# Patient Record
Sex: MALE | Race: WHITE | NOT HISPANIC OR LATINO | URBAN - METROPOLITAN AREA
[De-identification: names, ages, dates, MRNs, and addresses within clinical notes are randomized per-mention and may not be internally consistent; named-entity substitution may affect disease eponyms.]

---

## 2017-12-05 ENCOUNTER — EMERGENCY (EMERGENCY)
Facility: HOSPITAL | Age: 47
LOS: 1 days | Discharge: HOME | End: 2017-12-05
Attending: EMERGENCY MEDICINE | Admitting: EMERGENCY MEDICINE
Payer: COMMERCIAL

## 2017-12-05 VITALS
TEMPERATURE: 98 F | RESPIRATION RATE: 16 BRPM | DIASTOLIC BLOOD PRESSURE: 100 MMHG | OXYGEN SATURATION: 97 % | HEART RATE: 91 BPM | SYSTOLIC BLOOD PRESSURE: 163 MMHG | WEIGHT: 315 LBS | HEIGHT: 71 IN

## 2017-12-05 VITALS
OXYGEN SATURATION: 97 % | SYSTOLIC BLOOD PRESSURE: 142 MMHG | RESPIRATION RATE: 16 BRPM | TEMPERATURE: 98 F | DIASTOLIC BLOOD PRESSURE: 94 MMHG | HEART RATE: 89 BPM

## 2017-12-05 DIAGNOSIS — Z98.890 OTHER SPECIFIED POSTPROCEDURAL STATES: Chronic | ICD-10-CM

## 2017-12-05 PROCEDURE — 99283 EMERGENCY DEPT VISIT LOW MDM: CPT

## 2017-12-05 PROCEDURE — 73110 X-RAY EXAM OF WRIST: CPT

## 2017-12-05 PROCEDURE — 99283 EMERGENCY DEPT VISIT LOW MDM: CPT | Mod: 25

## 2017-12-05 PROCEDURE — 73110 X-RAY EXAM OF WRIST: CPT | Mod: 26,LT

## 2017-12-05 NOTE — ED PROVIDER NOTE - MUSCULOSKELETAL, MLM
Spine appears normal, range of motion is not limited, joint tenderness left wrist no obvious deformity.

## 2017-12-05 NOTE — ED PROVIDER NOTE - OBJECTIVE STATEMENT
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 complain of wrist left denies other injury.

## 2017-12-05 NOTE — ED ADULT NURSE NOTE - OBJECTIVE STATEMENT
47 yr old male c/o pain in left wrist s.p stretcher falling on wrist while at work. Pt states pain is when he makes a fist and movement.

## 2018-03-15 ENCOUNTER — APPOINTMENT (OUTPATIENT)
Dept: BARIATRICS | Facility: CLINIC | Age: 48
End: 2018-03-15
Payer: COMMERCIAL

## 2018-03-15 VITALS
WEIGHT: 315 LBS | BODY MASS INDEX: 44.1 KG/M2 | OXYGEN SATURATION: 96 % | SYSTOLIC BLOOD PRESSURE: 128 MMHG | HEIGHT: 71 IN | DIASTOLIC BLOOD PRESSURE: 76 MMHG | HEART RATE: 96 BPM

## 2018-03-15 PROCEDURE — 99244 OFF/OP CNSLTJ NEW/EST MOD 40: CPT

## 2018-03-30 ENCOUNTER — APPOINTMENT (OUTPATIENT)
Dept: CARDIOLOGY | Facility: CLINIC | Age: 48
End: 2018-03-30
Payer: COMMERCIAL

## 2018-03-30 ENCOUNTER — NON-APPOINTMENT (OUTPATIENT)
Age: 48
End: 2018-03-30

## 2018-03-30 VITALS
WEIGHT: 315 LBS | HEIGHT: 71 IN | DIASTOLIC BLOOD PRESSURE: 84 MMHG | OXYGEN SATURATION: 97 % | HEART RATE: 80 BPM | BODY MASS INDEX: 44.1 KG/M2 | SYSTOLIC BLOOD PRESSURE: 147 MMHG

## 2018-03-30 VITALS — DIASTOLIC BLOOD PRESSURE: 89 MMHG | SYSTOLIC BLOOD PRESSURE: 144 MMHG

## 2018-03-30 PROCEDURE — 99214 OFFICE O/P EST MOD 30 MIN: CPT

## 2018-03-30 PROCEDURE — 93000 ELECTROCARDIOGRAM COMPLETE: CPT | Mod: 59

## 2018-03-30 PROCEDURE — 93224 XTRNL ECG REC UP TO 48 HRS: CPT

## 2018-04-11 ENCOUNTER — RESULT CHARGE (OUTPATIENT)
Age: 48
End: 2018-04-11

## 2018-04-20 DIAGNOSIS — E55.9 VITAMIN D DEFICIENCY, UNSPECIFIED: ICD-10-CM

## 2018-04-20 DIAGNOSIS — E11.65 TYPE 2 DIABETES MELLITUS WITH HYPERGLYCEMIA: ICD-10-CM

## 2018-04-20 LAB
25(OH)D3 SERPL-MCNC: 7.2 NG/ML
ALBUMIN SERPL ELPH-MCNC: 3.7 G/DL
ALP BLD-CCNC: 66 U/L
ALT SERPL-CCNC: 28 U/L
ANION GAP SERPL CALC-SCNC: 14 MMOL/L
AST SERPL-CCNC: 16 U/L
BASOPHILS # BLD AUTO: 0.02 K/UL
BASOPHILS NFR BLD AUTO: 0.3 %
BILIRUB SERPL-MCNC: 0.5 MG/DL
BUN SERPL-MCNC: 13 MG/DL
CALCIUM SERPL-MCNC: 8.5 MG/DL
CALCIUM SERPL-MCNC: 8.5 MG/DL
CHLORIDE SERPL-SCNC: 99 MMOL/L
CHOLEST SERPL-MCNC: 197 MG/DL
CHOLEST/HDLC SERPL: 6.4 RATIO
CO2 SERPL-SCNC: 25 MMOL/L
CREAT SERPL-MCNC: 1 MG/DL
EOSINOPHIL # BLD AUTO: 0.24 K/UL
EOSINOPHIL NFR BLD AUTO: 3.2 %
FERRITIN SERPL-MCNC: 619 NG/ML
FOLATE SERPL-MCNC: 13.4 NG/ML
GLUCOSE SERPL-MCNC: 264 MG/DL
HBA1C MFR BLD HPLC: 11.2 %
HCT VFR BLD CALC: 49.7 %
HDLC SERPL-MCNC: 31 MG/DL
HGB BLD-MCNC: 16 G/DL
IMM GRANULOCYTES NFR BLD AUTO: 0.5 %
INR PPP: 0.99 RATIO
IRON SATN MFR SERPL: 32 %
IRON SERPL-MCNC: 78 UG/DL
LDLC SERPL CALC-MCNC: 104 MG/DL
LYMPHOCYTES # BLD AUTO: 1.94 K/UL
LYMPHOCYTES NFR BLD AUTO: 26.3 %
MAN DIFF?: NORMAL
MCHC RBC-ENTMCNC: 28.4 PG
MCHC RBC-ENTMCNC: 32.2 GM/DL
MCV RBC AUTO: 88.3 FL
MONOCYTES # BLD AUTO: 0.65 K/UL
MONOCYTES NFR BLD AUTO: 8.8 %
NEUTROPHILS # BLD AUTO: 4.5 K/UL
NEUTROPHILS NFR BLD AUTO: 60.9 %
PARATHYROID HORMONE INTACT: 44 PG/ML
PLATELET # BLD AUTO: 162 K/UL
POTASSIUM SERPL-SCNC: 4 MMOL/L
PROT SERPL-MCNC: 6.9 G/DL
PT BLD: 11.2 SEC
RBC # BLD: 5.63 M/UL
RBC # FLD: 13.4 %
SODIUM SERPL-SCNC: 138 MMOL/L
TIBC SERPL-MCNC: 242 UG/DL
TRIGL SERPL-MCNC: 308 MG/DL
TSH SERPL-ACNC: 3.33 UIU/ML
UIBC SERPL-MCNC: 164 UG/DL
VIT A SERPL-MCNC: 30.2 UG/DL
VIT B1 SERPL-MCNC: 136.7 NMOL/L
VIT B12 SERPL-MCNC: 431 PG/ML
VIT B6 SERPL-MCNC: 5.4 UG/L
WBC # FLD AUTO: 7.39 K/UL

## 2018-04-25 ENCOUNTER — TRANSCRIPTION ENCOUNTER (OUTPATIENT)
Age: 48
End: 2018-04-25

## 2018-05-04 ENCOUNTER — MESSAGE (OUTPATIENT)
Age: 48
End: 2018-05-04

## 2018-05-24 ENCOUNTER — APPOINTMENT (OUTPATIENT)
Dept: CARDIOLOGY | Facility: CLINIC | Age: 48
End: 2018-05-24
Payer: COMMERCIAL

## 2018-05-24 PROCEDURE — 93306 TTE W/DOPPLER COMPLETE: CPT

## 2018-05-30 ENCOUNTER — APPOINTMENT (OUTPATIENT)
Dept: CARDIOLOGY | Facility: CLINIC | Age: 48
End: 2018-05-30
Payer: COMMERCIAL

## 2018-05-30 PROCEDURE — 93351 STRESS TTE COMPLETE: CPT

## 2018-05-30 PROCEDURE — 93352 ADMIN ECG CONTRAST AGENT: CPT

## 2018-05-30 PROCEDURE — 93320 DOPPLER ECHO COMPLETE: CPT

## 2018-05-31 ENCOUNTER — APPOINTMENT (OUTPATIENT)
Dept: PULMONOLOGY | Facility: CLINIC | Age: 48
End: 2018-05-31
Payer: COMMERCIAL

## 2018-05-31 ENCOUNTER — APPOINTMENT (OUTPATIENT)
Dept: GASTROENTEROLOGY | Facility: CLINIC | Age: 48
End: 2018-05-31
Payer: COMMERCIAL

## 2018-05-31 VITALS
HEIGHT: 71 IN | SYSTOLIC BLOOD PRESSURE: 132 MMHG | HEART RATE: 88 BPM | RESPIRATION RATE: 14 BRPM | OXYGEN SATURATION: 95 % | DIASTOLIC BLOOD PRESSURE: 78 MMHG | WEIGHT: 315 LBS | BODY MASS INDEX: 44.1 KG/M2

## 2018-05-31 VITALS
HEIGHT: 71 IN | SYSTOLIC BLOOD PRESSURE: 130 MMHG | HEART RATE: 89 BPM | OXYGEN SATURATION: 95 % | DIASTOLIC BLOOD PRESSURE: 74 MMHG

## 2018-05-31 DIAGNOSIS — F17.290 NICOTINE DEPENDENCE, OTHER TOBACCO PRODUCT, UNCOMPLICATED: ICD-10-CM

## 2018-05-31 PROCEDURE — 99204 OFFICE O/P NEW MOD 45 MIN: CPT | Mod: 25

## 2018-05-31 PROCEDURE — 99214 OFFICE O/P EST MOD 30 MIN: CPT

## 2018-05-31 PROCEDURE — 94010 BREATHING CAPACITY TEST: CPT

## 2018-05-31 PROCEDURE — 85018 HEMOGLOBIN: CPT | Mod: QW

## 2018-05-31 PROCEDURE — 94729 DIFFUSING CAPACITY: CPT

## 2018-05-31 PROCEDURE — 94727 GAS DIL/WSHOT DETER LNG VOL: CPT

## 2018-06-01 ENCOUNTER — MESSAGE (OUTPATIENT)
Age: 48
End: 2018-06-01

## 2018-06-07 ENCOUNTER — NON-APPOINTMENT (OUTPATIENT)
Age: 48
End: 2018-06-07

## 2018-06-07 ENCOUNTER — APPOINTMENT (OUTPATIENT)
Dept: ELECTROPHYSIOLOGY | Facility: CLINIC | Age: 48
End: 2018-06-07
Payer: COMMERCIAL

## 2018-06-07 VITALS
HEART RATE: 79 BPM | OXYGEN SATURATION: 96 % | HEIGHT: 71 IN | SYSTOLIC BLOOD PRESSURE: 122 MMHG | DIASTOLIC BLOOD PRESSURE: 72 MMHG

## 2018-06-07 PROCEDURE — 99244 OFF/OP CNSLTJ NEW/EST MOD 40: CPT

## 2018-06-07 PROCEDURE — 93000 ELECTROCARDIOGRAM COMPLETE: CPT

## 2018-06-21 ENCOUNTER — APPOINTMENT (OUTPATIENT)
Dept: BARIATRICS/WEIGHT MGMT | Facility: CLINIC | Age: 48
End: 2018-06-21
Payer: COMMERCIAL

## 2018-06-21 VITALS — BODY MASS INDEX: 52.71 KG/M2 | WEIGHT: 315 LBS

## 2018-06-21 DIAGNOSIS — F17.200 NICOTINE DEPENDENCE, UNSPECIFIED, UNCOMPLICATED: ICD-10-CM

## 2018-06-21 PROCEDURE — 90791 PSYCH DIAGNOSTIC EVALUATION: CPT

## 2018-06-22 ENCOUNTER — OUTPATIENT (OUTPATIENT)
Dept: OUTPATIENT SERVICES | Facility: HOSPITAL | Age: 48
LOS: 1 days | End: 2018-06-22
Payer: COMMERCIAL

## 2018-06-22 DIAGNOSIS — Z98.890 OTHER SPECIFIED POSTPROCEDURAL STATES: Chronic | ICD-10-CM

## 2018-06-22 DIAGNOSIS — G47.33 OBSTRUCTIVE SLEEP APNEA (ADULT) (PEDIATRIC): ICD-10-CM

## 2018-06-22 PROCEDURE — 95810 POLYSOM 6/> YRS 4/> PARAM: CPT | Mod: 26

## 2018-06-22 PROCEDURE — 95810 POLYSOM 6/> YRS 4/> PARAM: CPT

## 2018-07-02 ENCOUNTER — APPOINTMENT (OUTPATIENT)
Dept: PULMONOLOGY | Facility: CLINIC | Age: 48
End: 2018-07-02
Payer: COMMERCIAL

## 2018-07-02 VITALS — HEART RATE: 91 BPM | DIASTOLIC BLOOD PRESSURE: 90 MMHG | OXYGEN SATURATION: 96 % | SYSTOLIC BLOOD PRESSURE: 140 MMHG

## 2018-07-02 PROCEDURE — 99214 OFFICE O/P EST MOD 30 MIN: CPT

## 2018-07-02 RX ORDER — ZOLPIDEM TARTRATE 10 MG/1
10 TABLET ORAL
Qty: 1 | Refills: 0 | Status: DISCONTINUED | COMMUNITY
Start: 2018-05-31 | End: 2018-07-02

## 2018-07-05 ENCOUNTER — APPOINTMENT (OUTPATIENT)
Dept: PULMONOLOGY | Facility: CLINIC | Age: 48
End: 2018-07-05

## 2018-07-13 ENCOUNTER — APPOINTMENT (OUTPATIENT)
Dept: BARIATRICS/WEIGHT MGMT | Facility: CLINIC | Age: 48
End: 2018-07-13
Payer: COMMERCIAL

## 2018-07-13 VITALS — BODY MASS INDEX: 44.1 KG/M2 | WEIGHT: 315 LBS | HEIGHT: 71 IN

## 2018-07-13 PROCEDURE — 97802 MEDICAL NUTRITION INDIV IN: CPT

## 2018-07-24 PROBLEM — I10 ESSENTIAL (PRIMARY) HYPERTENSION: Chronic | Status: ACTIVE | Noted: 2017-12-05

## 2018-07-24 PROBLEM — E03.9 HYPOTHYROIDISM, UNSPECIFIED: Chronic | Status: ACTIVE | Noted: 2017-12-05

## 2018-07-31 ENCOUNTER — OUTPATIENT (OUTPATIENT)
Dept: OUTPATIENT SERVICES | Facility: HOSPITAL | Age: 48
LOS: 1 days | End: 2018-07-31
Payer: COMMERCIAL

## 2018-07-31 DIAGNOSIS — G47.33 OBSTRUCTIVE SLEEP APNEA (ADULT) (PEDIATRIC): ICD-10-CM

## 2018-07-31 DIAGNOSIS — Z98.890 OTHER SPECIFIED POSTPROCEDURAL STATES: Chronic | ICD-10-CM

## 2018-07-31 PROCEDURE — 95811 POLYSOM 6/>YRS CPAP 4/> PARM: CPT | Mod: 26

## 2018-07-31 PROCEDURE — 95811 POLYSOM 6/>YRS CPAP 4/> PARM: CPT

## 2018-09-16 ENCOUNTER — TRANSCRIPTION ENCOUNTER (OUTPATIENT)
Age: 48
End: 2018-09-16

## 2018-10-05 ENCOUNTER — APPOINTMENT (OUTPATIENT)
Dept: BARIATRICS/WEIGHT MGMT | Facility: CLINIC | Age: 48
End: 2018-10-05
Payer: COMMERCIAL

## 2018-10-05 VITALS — WEIGHT: 315 LBS | BODY MASS INDEX: 44.1 KG/M2 | HEIGHT: 71 IN

## 2018-10-05 PROCEDURE — 97803 MED NUTRITION INDIV SUBSEQ: CPT

## 2018-10-11 ENCOUNTER — APPOINTMENT (OUTPATIENT)
Dept: PULMONOLOGY | Facility: CLINIC | Age: 48
End: 2018-10-11
Payer: COMMERCIAL

## 2018-10-11 VITALS
SYSTOLIC BLOOD PRESSURE: 138 MMHG | DIASTOLIC BLOOD PRESSURE: 80 MMHG | OXYGEN SATURATION: 98 % | RESPIRATION RATE: 16 BRPM | HEART RATE: 100 BPM

## 2018-10-11 PROCEDURE — 99214 OFFICE O/P EST MOD 30 MIN: CPT

## 2018-10-16 ENCOUNTER — APPOINTMENT (OUTPATIENT)
Dept: GASTROENTEROLOGY | Facility: HOSPITAL | Age: 48
End: 2018-10-16

## 2018-10-24 ENCOUNTER — APPOINTMENT (OUTPATIENT)
Dept: ENDOCRINOLOGY | Facility: CLINIC | Age: 48
End: 2018-10-24
Payer: COMMERCIAL

## 2018-10-24 VITALS
SYSTOLIC BLOOD PRESSURE: 128 MMHG | BODY MASS INDEX: 44.1 KG/M2 | DIASTOLIC BLOOD PRESSURE: 80 MMHG | WEIGHT: 315 LBS | HEART RATE: 87 BPM | HEIGHT: 71 IN

## 2018-10-24 LAB — GLUCOSE BLDC GLUCOMTR-MCNC: 225

## 2018-10-24 PROCEDURE — 82962 GLUCOSE BLOOD TEST: CPT

## 2018-10-24 PROCEDURE — 99244 OFF/OP CNSLTJ NEW/EST MOD 40: CPT | Mod: 25

## 2018-12-06 ENCOUNTER — APPOINTMENT (OUTPATIENT)
Dept: ENDOCRINOLOGY | Facility: CLINIC | Age: 48
End: 2018-12-06

## 2018-12-06 ENCOUNTER — APPOINTMENT (OUTPATIENT)
Dept: CARDIOLOGY | Facility: CLINIC | Age: 48
End: 2018-12-06
Payer: COMMERCIAL

## 2018-12-06 PROCEDURE — 93306 TTE W/DOPPLER COMPLETE: CPT

## 2018-12-07 ENCOUNTER — APPOINTMENT (OUTPATIENT)
Dept: BARIATRICS/WEIGHT MGMT | Facility: CLINIC | Age: 48
End: 2018-12-07
Payer: COMMERCIAL

## 2018-12-07 VITALS — HEIGHT: 71 IN | WEIGHT: 315 LBS | BODY MASS INDEX: 44.1 KG/M2

## 2018-12-07 PROCEDURE — 97803 MED NUTRITION INDIV SUBSEQ: CPT

## 2019-01-07 ENCOUNTER — MESSAGE (OUTPATIENT)
Age: 49
End: 2019-01-07

## 2019-01-17 ENCOUNTER — APPOINTMENT (OUTPATIENT)
Dept: ELECTROPHYSIOLOGY | Facility: CLINIC | Age: 49
End: 2019-01-17

## 2019-01-18 ENCOUNTER — OUTPATIENT (OUTPATIENT)
Dept: OUTPATIENT SERVICES | Facility: HOSPITAL | Age: 49
LOS: 1 days | End: 2019-01-18
Payer: COMMERCIAL

## 2019-01-18 ENCOUNTER — APPOINTMENT (OUTPATIENT)
Dept: GASTROENTEROLOGY | Facility: HOSPITAL | Age: 49
End: 2019-01-18

## 2019-01-18 ENCOUNTER — RESULT REVIEW (OUTPATIENT)
Age: 49
End: 2019-01-18

## 2019-01-18 DIAGNOSIS — Z98.890 OTHER SPECIFIED POSTPROCEDURAL STATES: Chronic | ICD-10-CM

## 2019-01-18 DIAGNOSIS — R10.13 EPIGASTRIC PAIN: ICD-10-CM

## 2019-01-18 DIAGNOSIS — K29.00 ACUTE GASTRITIS W/OUT BLEEDING: ICD-10-CM

## 2019-01-18 LAB — GLUCOSE BLDC GLUCOMTR-MCNC: 192 MG/DL — HIGH (ref 70–99)

## 2019-01-18 PROCEDURE — 88305 TISSUE EXAM BY PATHOLOGIST: CPT | Mod: 26

## 2019-01-18 PROCEDURE — 82962 GLUCOSE BLOOD TEST: CPT

## 2019-01-18 PROCEDURE — 43239 EGD BIOPSY SINGLE/MULTIPLE: CPT

## 2019-01-18 PROCEDURE — 88342 IMHCHEM/IMCYTCHM 1ST ANTB: CPT | Mod: 26

## 2019-01-18 PROCEDURE — 88342 IMHCHEM/IMCYTCHM 1ST ANTB: CPT

## 2019-01-18 PROCEDURE — 88305 TISSUE EXAM BY PATHOLOGIST: CPT

## 2019-01-18 NOTE — PROCEDURE
[With Biopsy] : with biopsy [Dyspepsia] : dyspepsia [Procedure Explained] : The procedure was explained [Allergies Reviewed] : allergies reviewed. [Risks] : Risks [Benefits] : benefits [Alternatives] : alternatives [Consent Obtained] : written consent was obtained prior to the procedure and is detailed in the patient's record [Patient] : the patient [Automated Blood Pressure Cuff] : automated blood pressure cuff [Cardiac Monitor] : cardiac monitor [Pulse Oximeter] : pulse oximeter [3] : 3 [Sedation Clearance] : the patient was cleared for moderate sedation [Performed By: ___] : Performed by:  DUC [Versed ___ mg IV] : Versed [unfilled] ~Umg intravenously [Propofol ___ mg IV] : Propofol [unfilled] ~Umg intravenously [Time started: ___] : Start Time:  [unfilled] [Time Completed: ___] : Completion Time:  [unfilled] [Erythema] : erythema [Normal] : Normal [Sent to Pathology] : was sent to pathology for analysis [Tolerated Well] : the patient tolerated the procedure well [Vital Signs Stable] : the vital signs were stable [de-identified] : Biopsies taken for H. Pylori. [de-identified] : Biopsies taken for H. Pylori. [de-identified] : Mild gastritis o/w normal EGD. Rule out H. Pylori infection.

## 2019-01-18 NOTE — ASSESSMENT
[FreeTextEntry1] : Omeprazole 40 mg./d. Await biopsy results for H. Pylori. If negative cleared for bariatric surgery from a GI standpoint.

## 2019-01-18 NOTE — PHYSICAL EXAM
[General Appearance - Alert] : alert [General Appearance - In No Acute Distress] : in no acute distress [General Appearance - Well Nourished] : well nourished [General Appearance - Well Developed] : well developed [General Appearance - Well-Appearing] : healthy appearing [Sclera] : the sclera and conjunctiva were normal [PERRL With Normal Accommodation] : pupils were equal in size, round, and reactive to light [Extraocular Movements] : extraocular movements were intact [Outer Ear] : the ears and nose were normal in appearance [Examination Of The Oral Cavity] : the lips and gums were normal [Oropharynx] : the oropharynx was normal [Neck Appearance] : the appearance of the neck was normal [Neck Cervical Mass (___cm)] : no neck mass was observed [Jugular Venous Distention Increased] : there was no jugular-venous distention [Thyroid Diffuse Enlargement] : the thyroid was not enlarged [Thyroid Nodule] : there were no palpable thyroid nodules [Auscultation Breath Sounds / Voice Sounds] : lungs were clear to auscultation bilaterally [Heart Rate And Rhythm] : heart rate was normal and rhythm regular [Heart Sounds] : normal S1 and S2 [Heart Sounds Gallop] : no gallops [Murmurs] : no murmurs [Heart Sounds Pericardial Friction Rub] : no pericardial rub [Bowel Sounds] : normal bowel sounds [Abdomen Soft] : soft [Abdomen Tenderness] : non-tender [] : no hepato-splenomegaly [Abdomen Mass (___ Cm)] : no abdominal mass palpated [Patient Refused] : rectal exam was refused by the patient [No CVA Tenderness] : no ~M costovertebral angle tenderness [Abnormal Walk] : normal gait [Musculoskeletal - Swelling] : no joint swelling seen [Skin Color & Pigmentation] : normal skin color and pigmentation [Skin Turgor] : normal skin turgor [Oriented To Time, Place, And Person] : oriented to person, place, and time [FreeTextEntry1] : patient is morbidly obese

## 2019-01-18 NOTE — PROCEDURE
[With Biopsy] : with biopsy [Dyspepsia] : dyspepsia [Procedure Explained] : The procedure was explained [Allergies Reviewed] : allergies reviewed. [Risks] : Risks [Benefits] : benefits [Alternatives] : alternatives [Consent Obtained] : written consent was obtained prior to the procedure and is detailed in the patient's record [Patient] : the patient [Automated Blood Pressure Cuff] : automated blood pressure cuff [Cardiac Monitor] : cardiac monitor [Pulse Oximeter] : pulse oximeter [3] : 3 [Sedation Clearance] : the patient was cleared for moderate sedation [Performed By: ___] : Performed by:  DUC [Versed ___ mg IV] : Versed [unfilled] ~Umg intravenously [Propofol ___ mg IV] : Propofol [unfilled] ~Umg intravenously [Time started: ___] : Start Time:  [unfilled] [Time Completed: ___] : Completion Time:  [unfilled] [Erythema] : erythema [Normal] : Normal [Sent to Pathology] : was sent to pathology for analysis [Tolerated Well] : the patient tolerated the procedure well [Vital Signs Stable] : the vital signs were stable [de-identified] : Biopsies taken for H. Pylori. [de-identified] : Biopsies taken for H. Pylori. [de-identified] : Mild gastritis o/w normal EGD. Rule out H. Pylori infection.

## 2019-01-23 LAB — SURGICAL PATHOLOGY STUDY: SIGNIFICANT CHANGE UP

## 2019-02-11 ENCOUNTER — APPOINTMENT (OUTPATIENT)
Dept: ENDOCRINOLOGY | Facility: CLINIC | Age: 49
End: 2019-02-11
Payer: COMMERCIAL

## 2019-02-11 VITALS
BODY MASS INDEX: 44.1 KG/M2 | HEART RATE: 88 BPM | DIASTOLIC BLOOD PRESSURE: 72 MMHG | SYSTOLIC BLOOD PRESSURE: 136 MMHG | WEIGHT: 315 LBS | OXYGEN SATURATION: 98 % | HEIGHT: 71 IN

## 2019-02-11 LAB — GLUCOSE BLDC GLUCOMTR-MCNC: 245

## 2019-02-11 PROCEDURE — 82962 GLUCOSE BLOOD TEST: CPT

## 2019-02-11 PROCEDURE — 99214 OFFICE O/P EST MOD 30 MIN: CPT | Mod: 25

## 2019-02-11 NOTE — ASSESSMENT
[FreeTextEntry1] : 1. DM type 2, complicated by severe obesity, uncontrolled. In view of upcoming surgery reasonable to start drug rx with metformin  once daily. Will add glimepiride 2; hold a glp-1 in view of reflux/gastritis current symptoms. If no response will start insulin; advised home glucose monitoring\par 2. Hypothyroid, clinically euthyroid.

## 2019-02-11 NOTE — REASON FOR VISIT
[Follow-Up: _____] : a [unfilled] follow-up visit [FreeTextEntry1] : diabetes, hypothyroidism, hyperlipidemia

## 2019-02-11 NOTE — HISTORY OF PRESENT ILLNESS
[FreeTextEntry1] : History of diabetes for 2 years, found on routine lab tests. Not on meds at this time. He is undergoing pre-op evaluations for anticipated gastric sleeve.\par Past h/o chronic obesity, peak weight around 380 pounds, with some recent weight loss due to dietary changes. In addition to diabetes, the obesity has been complicated by sleep apnea and hypertriglyceridemia.\par He reports improvement in his A1C level from 10.3% to the latest 9.7%.  \par HIstory of hypothyroidism, also for 2 years, on synthroid .05 mg.\par \par started on metformin . Frequent bowel movements. A1C still elevated

## 2019-03-01 ENCOUNTER — APPOINTMENT (OUTPATIENT)
Dept: CARDIOLOGY | Facility: CLINIC | Age: 49
End: 2019-03-01

## 2019-03-07 ENCOUNTER — TRANSCRIPTION ENCOUNTER (OUTPATIENT)
Age: 49
End: 2019-03-07

## 2019-03-13 ENCOUNTER — TRANSCRIPTION ENCOUNTER (OUTPATIENT)
Age: 49
End: 2019-03-13

## 2019-04-04 ENCOUNTER — APPOINTMENT (OUTPATIENT)
Dept: PULMONOLOGY | Facility: CLINIC | Age: 49
End: 2019-04-04
Payer: COMMERCIAL

## 2019-04-04 VITALS
DIASTOLIC BLOOD PRESSURE: 88 MMHG | BODY MASS INDEX: 51.6 KG/M2 | HEART RATE: 96 BPM | SYSTOLIC BLOOD PRESSURE: 142 MMHG | RESPIRATION RATE: 16 BRPM | OXYGEN SATURATION: 97 % | WEIGHT: 315 LBS

## 2019-04-04 PROCEDURE — 99214 OFFICE O/P EST MOD 30 MIN: CPT

## 2019-04-04 NOTE — CONSULT LETTER
[Dear  ___] : Dear  [unfilled], [Consult Letter:] : I had the pleasure of evaluating your patient, [unfilled]. [Please see my note below.] : Please see my note below. [Sincerely,] : Sincerely, [DrThang  ___] : Dr. XAVIER [FreeTextEntry3] : [facility]par

## 2019-04-04 NOTE — PHYSICAL EXAM
[General Appearance - Well Developed] : well developed [Normal Appearance] : normal appearance [Well Groomed] : well groomed [General Appearance - Well Nourished] : well nourished [No Deformities] : no deformities [General Appearance - In No Acute Distress] : no acute distress [Normal Conjunctiva] : the conjunctiva exhibited no abnormalities [Eyelids - No Xanthelasma] : the eyelids demonstrated no xanthelasmas [IV] : IV [Neck Appearance] : the appearance of the neck was normal [Neck Cervical Mass (___cm)] : no neck mass was observed [Jugular Venous Distention Increased] : there was no jugular-venous distention [Thyroid Diffuse Enlargement] : the thyroid was not enlarged [Thyroid Nodule] : there were no palpable thyroid nodules [Neck Circumference: ___] : neck circumference is [unfilled] [Heart Rate And Rhythm] : heart rate and rhythm were normal [Heart Sounds] : normal S1 and S2 [Murmurs] : no murmurs present [Respiration, Rhythm And Depth] : normal respiratory rhythm and effort [Exaggerated Use Of Accessory Muscles For Inspiration] : no accessory muscle use [Auscultation Breath Sounds / Voice Sounds] : lungs were clear to auscultation bilaterally [Abdomen Soft] : soft [Abdomen Tenderness] : non-tender [Abdomen Mass (___ Cm)] : no abdominal mass palpated [Abnormal Walk] : normal gait [Gait - Sufficient For Exercise Testing] : the gait was sufficient for exercise testing [Nail Clubbing] : no clubbing of the fingernails [Cyanosis, Localized] : no localized cyanosis [Petechial Hemorrhages (___cm)] : no petechial hemorrhages [Skin Color & Pigmentation] : normal skin color and pigmentation [] : no rash [No Venous Stasis] : no venous stasis [Skin Lesions] : no skin lesions [No Skin Ulcers] : no skin ulcer [No Xanthoma] : no  xanthoma was observed [Deep Tendon Reflexes (DTR)] : deep tendon reflexes were 2+ and symmetric [Sensation] : the sensory exam was normal to light touch and pinprick [No Focal Deficits] : no focal deficits [FreeTextEntry1] : normal

## 2019-04-04 NOTE — PROCEDURE
[FreeTextEntry1] : PSG: AHI 88.4 REM-specific index was 54.5, with a supine index of the under 19.3. The obstructive index was 27.9, with 340, central apneas, and 5 mixed apneas. Also seen during the course of the study.

## 2019-04-04 NOTE — HISTORY OF PRESENT ILLNESS
[Obstructive Sleep Apnea] : obstructive sleep apnea [To Bed: ___] : ~he/she~ goes to bed at [unfilled] [Arises: ___] : arises at [unfilled] [Sleep Onset Latency: ___ minutes] : sleep onset latency of [unfilled] minutes reported [Nocturnal Awakenings: ___] : ~he/she~ typically has [unfilled] nocturnal awakenings [Date: ___] : Date of most recent diagnostic polysomnogram: [unfilled] [AHI: ___ per hour] : Apnea-hypopnea index:  [unfilled] per hour [BPAP w/IPAP: ____ cmH2O] : BPAP with IPAP: [unfilled] cmH2O [BPAP w/EPAP: ___ cmH2O] : BPAP with EPAP: [unfilled] cmH2O [% Days used: ____] : Days used: [unfilled] % [% Days used > 4 hrs: ____] : Days used > 4 hrs: [unfilled] % [Therapy based AHI: ___ /hr] : Therapy based AHI: [unfilled] / hr [Snoring] : no snoring [Witnessed Apneas] : no witnessed sleep apnea [Frequent Nocturnal Awakening] : no nocturnal awakening [Daytime Somnolence] : no daytime somnolence [Unintentional Sleep while Active] : no unintentional sleep while active [Unintentional Sleep While Inactive] : no unintentional sleep while inactive [Awakes Unrefreshed] : does not awake unrefreshed [Awakes with Headache] : no headache upon awakening [Awakening With Dry Mouth] : no dry mouth upon awakening [Recent  Weight Gain] : no recent weight gain [Unusual Sleep Behavior] : no unusual sleep behavior [Hypersomnolence] : no hypersomnolence [Cataplexy] : no cataplexy [Sleep Paralysis] : no sleep paralysis [Hypnagogic Hallucinations] : no hallucinations when falling asleep [Hypnopompic Hallucinations] : no hallucinations when awakening [FreeTextEntry1] : Pending bariatric surgery

## 2019-04-04 NOTE — DISCUSSION/SUMMARY
[Obstructive Sleep Apnea] : obstructive sleep apnea [Severe] : severe [Responding to Treatment] : responding to treatment [CPAP Titration] : CPAP titration [Alcohol Avoidance] : alcohol avoidance [Sedative Avoidance] : sedative avoidance [Smoking Cessation] : smoking cessation [Weight Loss Program] : weight loss program [BiPAP] : BiPAP [Patient] : discussed with the patient [FreeTextEntry1] : No pulmonary contraindication to bariatric surgery [de-identified] : with central apnea [de-identified] : Patient compliant with CPAP/BiPAP and benefiting from therapy

## 2019-06-26 ENCOUNTER — APPOINTMENT (OUTPATIENT)
Dept: ENDOCRINOLOGY | Facility: CLINIC | Age: 49
End: 2019-06-26

## 2019-06-27 ENCOUNTER — APPOINTMENT (OUTPATIENT)
Dept: DERMATOLOGY | Facility: CLINIC | Age: 49
End: 2019-06-27
Payer: COMMERCIAL

## 2019-06-27 VITALS — HEIGHT: 71 IN | WEIGHT: 315 LBS | BODY MASS INDEX: 44.1 KG/M2

## 2019-06-27 DIAGNOSIS — D22.5 MELANOCYTIC NEVI OF TRUNK: ICD-10-CM

## 2019-06-27 DIAGNOSIS — L91.8 OTHER HYPERTROPHIC DISORDERS OF THE SKIN: ICD-10-CM

## 2019-06-27 DIAGNOSIS — Z84.0 FAMILY HISTORY OF DISEASES OF THE SKIN AND SUBCUTANEOUS TISSUE: ICD-10-CM

## 2019-06-27 DIAGNOSIS — L82.1 OTHER SEBORRHEIC KERATOSIS: ICD-10-CM

## 2019-06-27 DIAGNOSIS — Z91.89 OTHER SPECIFIED PERSONAL RISK FACTORS, NOT ELSEWHERE CLASSIFIED: ICD-10-CM

## 2019-06-27 PROCEDURE — 99203 OFFICE O/P NEW LOW 30 MIN: CPT

## 2019-06-27 RX ORDER — OMEPRAZOLE 40 MG/1
40 CAPSULE, DELAYED RELEASE ORAL
Qty: 30 | Refills: 5 | Status: DISCONTINUED | COMMUNITY
Start: 2019-01-18 | End: 2019-06-27

## 2019-06-27 NOTE — PHYSICAL EXAM
[Alert] : alert [Oriented x 3] : ~L oriented x 3 [Well Nourished] : well nourished [FreeTextEntry3] : The following areas were examined and no significant abnormalities were seen except as noted below:\par \par Type II skin\par \par scalp, face, eyelids, nose, lips, ears, neck, chest, abdomen, back,groin, buttocks, right arm, left arm, right hand, left hand,\par right  leg, left leg, right foot, left foot\par \par Right mid forearm: 4X4 mm pink slightly verrucous papule\par Multiple small tan macules present on the forearms and shoulders\par Moderate small excrescences present on neck\par Moderate  small and large excrescences present in both axillae\par One large lesion in the right axilla is erythematous\par Back: Mild small brown macules\par \par No suspicious lesions seen

## 2019-06-27 NOTE — ASSESSMENT
[FreeTextEntry1] : Probable inflamed seborrheic keratosis on right mid forearm\par Multiple skin tags and fibroepithelial polyps

## 2019-07-11 ENCOUNTER — APPOINTMENT (OUTPATIENT)
Dept: ENDOCRINOLOGY | Facility: CLINIC | Age: 49
End: 2019-07-11
Payer: COMMERCIAL

## 2019-07-11 VITALS
BODY MASS INDEX: 44.1 KG/M2 | DIASTOLIC BLOOD PRESSURE: 80 MMHG | WEIGHT: 315 LBS | OXYGEN SATURATION: 98 % | SYSTOLIC BLOOD PRESSURE: 120 MMHG | HEART RATE: 84 BPM | HEIGHT: 71 IN

## 2019-07-11 LAB — GLUCOSE BLDC GLUCOMTR-MCNC: 219

## 2019-07-11 PROCEDURE — 82962 GLUCOSE BLOOD TEST: CPT

## 2019-07-11 PROCEDURE — 99213 OFFICE O/P EST LOW 20 MIN: CPT | Mod: 25

## 2019-07-11 NOTE — ASSESSMENT
[FreeTextEntry1] : DM type 2, improving but still not adequately controlled. Will try to double up on meds; metformin  two daily and glimepiride 2 mg two daily. Recheck labs in 2 months; target for surgery would be A1C less than 9%

## 2019-07-11 NOTE — HISTORY OF PRESENT ILLNESS
[FreeTextEntry1] : History of diabetes for 2 years, found on routine lab tests. Not on meds at this time. He is undergoing pre-op evaluations for anticipated gastric sleeve.\par Past h/o chronic obesity, peak weight around 380 pounds, with some recent weight loss due to dietary changes. In addition to diabetes, the obesity has been complicated by sleep apnea and hypertriglyceridemia.\par He reports improvement in his A1C level from 10.3% to the latest 9.7%.  \par HIstory of hypothyroidism, also for 2 years, on synthroid .05 mg.\par \par started on metformin  and glimepiride 2.

## 2019-07-23 ENCOUNTER — RX CHANGE (OUTPATIENT)
Age: 49
End: 2019-07-23

## 2019-07-23 ENCOUNTER — TRANSCRIPTION ENCOUNTER (OUTPATIENT)
Age: 49
End: 2019-07-23

## 2019-08-12 ENCOUNTER — RX CHANGE (OUTPATIENT)
Age: 49
End: 2019-08-12

## 2019-08-12 ENCOUNTER — TRANSCRIPTION ENCOUNTER (OUTPATIENT)
Age: 49
End: 2019-08-12

## 2019-09-04 ENCOUNTER — APPOINTMENT (OUTPATIENT)
Dept: DERMATOLOGY | Facility: CLINIC | Age: 49
End: 2019-09-04

## 2019-10-04 ENCOUNTER — LABORATORY RESULT (OUTPATIENT)
Age: 49
End: 2019-10-04

## 2019-10-09 ENCOUNTER — OTHER (OUTPATIENT)
Age: 49
End: 2019-10-09

## 2019-10-09 ENCOUNTER — TRANSCRIPTION ENCOUNTER (OUTPATIENT)
Age: 49
End: 2019-10-09

## 2019-10-10 ENCOUNTER — APPOINTMENT (OUTPATIENT)
Dept: ENDOCRINOLOGY | Facility: CLINIC | Age: 49
End: 2019-10-10
Payer: COMMERCIAL

## 2019-10-10 VITALS
SYSTOLIC BLOOD PRESSURE: 126 MMHG | HEIGHT: 71 IN | WEIGHT: 315 LBS | DIASTOLIC BLOOD PRESSURE: 80 MMHG | BODY MASS INDEX: 44.1 KG/M2 | HEART RATE: 105 BPM

## 2019-10-10 DIAGNOSIS — E78.1 PURE HYPERGLYCERIDEMIA: ICD-10-CM

## 2019-10-10 DIAGNOSIS — E11.65 TYPE 2 DIABETES MELLITUS WITH HYPERGLYCEMIA: ICD-10-CM

## 2019-10-10 LAB — GLUCOSE BLDC GLUCOMTR-MCNC: 275

## 2019-10-10 PROCEDURE — 82962 GLUCOSE BLOOD TEST: CPT

## 2019-10-10 PROCEDURE — 99214 OFFICE O/P EST MOD 30 MIN: CPT | Mod: 25

## 2019-10-10 NOTE — ASSESSMENT
[FreeTextEntry1] : DM type 2, improving control, now with A1C below 9, and can be cleared for surgery from my point of view. Discussed management on liquid diet\par hypothyroid, euthyroid on replacement\par hyperlipidemia, could improve further with better glycemic control

## 2019-10-10 NOTE — HISTORY OF PRESENT ILLNESS
[FreeTextEntry1] : History of diabetes for 2 years, found on routine lab tests. Not on meds at this time. He is undergoing pre-op evaluations for anticipated gastric sleeve.\par Past h/o chronic obesity, peak weight around 380 pounds, with some recent weight loss due to dietary changes. In addition to diabetes, the obesity has been complicated by sleep apnea and hypertriglyceridemia.\par He reports improvement in his A1C level from 10.3% to the latest 9.7%.  \par HIstory of hypothyroidism, also for 2 years, on synthroid .05 mg.\par \par started on metformin  bid and glimepiride 2 bid

## 2019-10-17 ENCOUNTER — APPOINTMENT (OUTPATIENT)
Dept: CARDIOLOGY | Facility: CLINIC | Age: 49
End: 2019-10-17
Payer: COMMERCIAL

## 2019-10-17 PROCEDURE — A9500: CPT

## 2019-10-17 PROCEDURE — 93015 CV STRESS TEST SUPVJ I&R: CPT

## 2019-10-17 PROCEDURE — 78452 HT MUSCLE IMAGE SPECT MULT: CPT

## 2019-10-24 ENCOUNTER — APPOINTMENT (OUTPATIENT)
Dept: CARDIOLOGY | Facility: CLINIC | Age: 49
End: 2019-10-24

## 2019-10-25 ENCOUNTER — NON-APPOINTMENT (OUTPATIENT)
Age: 49
End: 2019-10-25

## 2019-10-25 ENCOUNTER — APPOINTMENT (OUTPATIENT)
Dept: OPHTHALMOLOGY | Facility: CLINIC | Age: 49
End: 2019-10-25
Payer: COMMERCIAL

## 2019-10-25 ENCOUNTER — APPOINTMENT (OUTPATIENT)
Dept: CARDIOLOGY | Facility: CLINIC | Age: 49
End: 2019-10-25
Payer: COMMERCIAL

## 2019-10-25 VITALS
OXYGEN SATURATION: 99 % | SYSTOLIC BLOOD PRESSURE: 162 MMHG | HEIGHT: 71 IN | WEIGHT: 315 LBS | RESPIRATION RATE: 16 BRPM | BODY MASS INDEX: 44.1 KG/M2 | HEART RATE: 76 BPM | DIASTOLIC BLOOD PRESSURE: 90 MMHG

## 2019-10-25 VITALS — SYSTOLIC BLOOD PRESSURE: 138 MMHG | DIASTOLIC BLOOD PRESSURE: 76 MMHG

## 2019-10-25 DIAGNOSIS — I10 ESSENTIAL (PRIMARY) HYPERTENSION: ICD-10-CM

## 2019-10-25 DIAGNOSIS — Z01.810 ENCOUNTER FOR PREPROCEDURAL CARDIOVASCULAR EXAMINATION: ICD-10-CM

## 2019-10-25 DIAGNOSIS — I49.3 VENTRICULAR PREMATURE DEPOLARIZATION: ICD-10-CM

## 2019-10-25 DIAGNOSIS — E66.01 MORBID (SEVERE) OBESITY DUE TO EXCESS CALORIES: ICD-10-CM

## 2019-10-25 PROCEDURE — 92133 CPTRZD OPH DX IMG PST SGM ON: CPT

## 2019-10-25 PROCEDURE — 92004 COMPRE OPH EXAM NEW PT 1/>: CPT

## 2019-10-25 PROCEDURE — 93000 ELECTROCARDIOGRAM COMPLETE: CPT

## 2019-10-25 PROCEDURE — 99214 OFFICE O/P EST MOD 30 MIN: CPT

## 2019-10-25 NOTE — PHYSICAL EXAM
[General Appearance - Well Developed] : well developed [Normal Appearance] : normal appearance [Well Groomed] : well groomed [General Appearance - Well Nourished] : well nourished [Normal Conjunctiva] : the conjunctiva exhibited no abnormalities [General Appearance - In No Acute Distress] : no acute distress [No Oral Pallor] : no oral pallor [No Oral Cyanosis] : no oral cyanosis [Normal Jugular Venous A Waves Present] : normal jugular venous A waves present [Normal Jugular Venous V Waves Present] : normal jugular venous V waves present [Exaggerated Use Of Accessory Muscles For Inspiration] : no accessory muscle use [Respiration, Rhythm And Depth] : normal respiratory rhythm and effort [Auscultation Breath Sounds / Voice Sounds] : lungs were clear to auscultation bilaterally [Heart Sounds] : normal S1 and S2 [Murmurs] : no murmurs present [Abdomen Soft] : soft [Abdomen Tenderness] : non-tender [Abnormal Walk] : normal gait [Nail Clubbing] : no clubbing of the fingernails [Cyanosis, Localized] : no localized cyanosis [Skin Turgor] : normal skin turgor [] : no rash [Oriented To Time, Place, And Person] : oriented to person, place, and time [Impaired Insight] : insight and judgment were intact [Affect] : the affect was normal [Memory Recent] : recent memory was not impaired

## 2019-10-25 NOTE — REVIEW OF SYSTEMS
[Headache] : headache [Recent Weight Gain (___ Lbs)] : recent [unfilled] ~Ulb weight gain [Blurry Vision] : blurred vision [Negative] : Heme/Lymph

## 2019-11-14 ENCOUNTER — APPOINTMENT (OUTPATIENT)
Dept: PULMONOLOGY | Facility: CLINIC | Age: 49
End: 2019-11-14
Payer: COMMERCIAL

## 2019-11-14 ENCOUNTER — APPOINTMENT (OUTPATIENT)
Dept: BARIATRICS/WEIGHT MGMT | Facility: CLINIC | Age: 49
End: 2019-11-14
Payer: COMMERCIAL

## 2019-11-14 VITALS
WEIGHT: 315 LBS | DIASTOLIC BLOOD PRESSURE: 88 MMHG | BODY MASS INDEX: 44.59 KG/M2 | HEART RATE: 100 BPM | OXYGEN SATURATION: 96 % | HEIGHT: 70.5 IN | SYSTOLIC BLOOD PRESSURE: 132 MMHG

## 2019-11-14 VITALS — WEIGHT: 315 LBS | HEIGHT: 70.5 IN | BODY MASS INDEX: 44.59 KG/M2

## 2019-11-14 PROCEDURE — 97803 MED NUTRITION INDIV SUBSEQ: CPT

## 2019-11-14 PROCEDURE — 99214 OFFICE O/P EST MOD 30 MIN: CPT

## 2019-11-14 NOTE — PROCEDURE
[FreeTextEntry1] : PSG: AHI 88.4 REM-specific index was 54.5, with a supine index of the under 19.3. The obstructive index was 27.9, with 340, central apneas, and 5 mixed apneas

## 2019-11-14 NOTE — HISTORY OF PRESENT ILLNESS
[Obstructive Sleep Apnea] : obstructive sleep apnea [To Bed: ___] : ~he/she~ goes to bed at [unfilled] [Arises: ___] : arises at [unfilled] [Sleep Onset Latency: ___ minutes] : sleep onset latency of [unfilled] minutes reported [Nocturnal Awakenings: ___] : ~he/she~ typically has [unfilled] nocturnal awakenings [AHI: ___ per hour] : Apnea-hypopnea index:  [unfilled] per hour [Date: ___] : Date of most recent diagnostic polysomnogram: [unfilled] [BPAP w/EPAP: ___ cmH2O] : BPAP with EPAP: [unfilled] cmH2O [BPAP w/IPAP: ____ cmH2O] : BPAP with IPAP: [unfilled] cmH2O [% Days used > 4 hrs: ____] : Days used > 4 hrs: [unfilled] % [% Days used: ____] : Days used: [unfilled] % [Therapy based AHI: ___ /hr] : Therapy based AHI: [unfilled] / hr [Snoring] : no snoring [Witnessed Apneas] : no witnessed sleep apnea [Frequent Nocturnal Awakening] : no nocturnal awakening [Daytime Somnolence] : no daytime somnolence [Unintentional Sleep While Inactive] : no unintentional sleep while inactive [Unintentional Sleep while Active] : no unintentional sleep while active [Awakes Unrefreshed] : does not awake unrefreshed [Awakes with Headache] : no headache upon awakening [Awakening With Dry Mouth] : no dry mouth upon awakening [Recent  Weight Gain] : no recent weight gain [Unusual Sleep Behavior] : no unusual sleep behavior [Hypersomnolence] : no hypersomnolence [Cataplexy] : no cataplexy [Sleep Paralysis] : no sleep paralysis [Hypnagogic Hallucinations] : no hallucinations when falling asleep [Hypnopompic Hallucinations] : no hallucinations when awakening [FreeTextEntry1] : No SOB

## 2019-11-14 NOTE — DISCUSSION/SUMMARY
[Obstructive Sleep Apnea] : obstructive sleep apnea [Severe] : severe [Responding to Treatment] : responding to treatment [CPAP Titration] : CPAP titration [Smoking Cessation] : smoking cessation [Alcohol Avoidance] : alcohol avoidance [Sedative Avoidance] : sedative avoidance [BiPAP] : BiPAP [Weight Loss Program] : weight loss program [Patient] : discussed with the patient [FreeTextEntry1] : No pulmonary contraindication to bariatric surgery [de-identified] : with central apnea [de-identified] : Patient compliant with CPAP/BiPAP and benefiting from therapy [de-identified] : Sample Medium F30 mask provided

## 2019-11-14 NOTE — CONSULT LETTER
[Dear  ___] : Dear  [unfilled], [Consult Letter:] : I had the pleasure of evaluating your patient, [unfilled]. [Sincerely,] : Sincerely, [Please see my note below.] : Please see my note below. [DrThang  ___] : Dr. XAVIER [FreeTextEntry3] : [facility]

## 2019-11-14 NOTE — PHYSICAL EXAM
[Normal Appearance] : normal appearance [General Appearance - Well Developed] : well developed [Well Groomed] : well groomed [General Appearance - Well Nourished] : well nourished [General Appearance - In No Acute Distress] : no acute distress [No Deformities] : no deformities [Eyelids - No Xanthelasma] : the eyelids demonstrated no xanthelasmas [Normal Conjunctiva] : the conjunctiva exhibited no abnormalities [IV] : IV [Neck Cervical Mass (___cm)] : no neck mass was observed [Jugular Venous Distention Increased] : there was no jugular-venous distention [Neck Appearance] : the appearance of the neck was normal [Thyroid Nodule] : there were no palpable thyroid nodules [Thyroid Diffuse Enlargement] : the thyroid was not enlarged [Neck Circumference: ___] : neck circumference is [unfilled] [Heart Rate And Rhythm] : heart rate and rhythm were normal [Murmurs] : no murmurs present [Heart Sounds] : normal S1 and S2 [Respiration, Rhythm And Depth] : normal respiratory rhythm and effort [Exaggerated Use Of Accessory Muscles For Inspiration] : no accessory muscle use [Auscultation Breath Sounds / Voice Sounds] : lungs were clear to auscultation bilaterally [Abdomen Soft] : soft [Abdomen Tenderness] : non-tender [Abnormal Walk] : normal gait [Abdomen Mass (___ Cm)] : no abdominal mass palpated [Gait - Sufficient For Exercise Testing] : the gait was sufficient for exercise testing [Nail Clubbing] : no clubbing of the fingernails [Cyanosis, Localized] : no localized cyanosis [Petechial Hemorrhages (___cm)] : no petechial hemorrhages [No Venous Stasis] : no venous stasis [Skin Color & Pigmentation] : normal skin color and pigmentation [] : no rash [Skin Lesions] : no skin lesions [No Xanthoma] : no  xanthoma was observed [No Skin Ulcers] : no skin ulcer [Deep Tendon Reflexes (DTR)] : deep tendon reflexes were 2+ and symmetric [Sensation] : the sensory exam was normal to light touch and pinprick [No Focal Deficits] : no focal deficits [FreeTextEntry1] : normal

## 2019-11-15 ENCOUNTER — APPOINTMENT (OUTPATIENT)
Dept: OPHTHALMOLOGY | Facility: CLINIC | Age: 49
End: 2019-11-15

## 2019-12-19 ENCOUNTER — APPOINTMENT (OUTPATIENT)
Dept: BARIATRICS/WEIGHT MGMT | Facility: CLINIC | Age: 49
End: 2019-12-19

## 2019-12-19 ENCOUNTER — APPOINTMENT (OUTPATIENT)
Dept: OPHTHALMOLOGY | Facility: CLINIC | Age: 49
End: 2019-12-19

## 2020-01-08 ENCOUNTER — TRANSCRIPTION ENCOUNTER (OUTPATIENT)
Age: 50
End: 2020-01-08

## 2020-01-16 ENCOUNTER — APPOINTMENT (OUTPATIENT)
Dept: BARIATRICS | Facility: CLINIC | Age: 50
End: 2020-01-16

## 2020-01-17 ENCOUNTER — NON-APPOINTMENT (OUTPATIENT)
Age: 50
End: 2020-01-17

## 2020-01-17 ENCOUNTER — APPOINTMENT (OUTPATIENT)
Dept: OPHTHALMOLOGY | Facility: CLINIC | Age: 50
End: 2020-01-17
Payer: COMMERCIAL

## 2020-01-17 PROCEDURE — 92083 EXTENDED VISUAL FIELD XM: CPT

## 2020-01-17 PROCEDURE — 92012 INTRM OPH EXAM EST PATIENT: CPT

## 2020-01-17 PROCEDURE — 76514 ECHO EXAM OF EYE THICKNESS: CPT

## 2020-03-02 ENCOUNTER — TRANSCRIPTION ENCOUNTER (OUTPATIENT)
Age: 50
End: 2020-03-02

## 2020-03-05 ENCOUNTER — APPOINTMENT (OUTPATIENT)
Dept: ENDOCRINOLOGY | Facility: CLINIC | Age: 50
End: 2020-03-05

## 2020-04-09 ENCOUNTER — APPOINTMENT (OUTPATIENT)
Dept: PULMONOLOGY | Facility: CLINIC | Age: 50
End: 2020-04-09

## 2020-04-25 ENCOUNTER — MESSAGE (OUTPATIENT)
Age: 50
End: 2020-04-25

## 2020-05-01 ENCOUNTER — APPOINTMENT (OUTPATIENT)
Dept: DISASTER EMERGENCY | Facility: CLINIC | Age: 50
End: 2020-05-01

## 2020-05-02 LAB
SARS-COV-2 IGG SERPL IA-ACNC: 0.6 INDEX
SARS-COV-2 IGG SERPL QL IA: NEGATIVE

## 2020-05-23 NOTE — HISTORY OF PRESENT ILLNESS
[Preoperative Visit] : for a medical evaluation prior to surgery [Scheduled Procedure ___] : a [unfilled] [Date of Surgery ___] : on [unfilled] [Surgeon Name ___] : surgeon: [unfilled] [Good] : Good [Diabetes] : diabetes [Pulmonary Disease] : pulmonary disease [Anti-Platelet Agents] : anti-platelet agents [Nicotine Dependence] : nicotine dependence [Sleep Apnea] : sleep apnea [Prior Anesthesia] : Prior anesthesia [Cardiovascular Stress Test] : a cardiac stress test ~T ~C was performed [Sudden Death] : sudden death [Fever] : no fever [Chills] : no chills [Fatigue] : no fatigue [Chest Pain] : no chest pain [Cough] : no cough [Dyspnea] : no dyspnea [Nausea] : no nausea [Dysuria] : no dysuria [Urinary Frequency] : no urinary frequency [Diarrhea] : no diarrhea [Vomiting] : no vomiting [Abdominal Pain] : no abdominal pain [Easy Bruising] : no easy bruising [Lower Extremity Swelling] : no lower extremity swelling [Poor Exercise Tolerance] : no poor exercise tolerance [Cardiovascular Disease] : no cardiovascular disease [Alcohol Use] : no  alcohol use [Renal Disease] : no renal disease [GI Disease] : no gastrointestinal disease [Thromboembolic Problems] : no thromboembolic problems [Frequent use of NSAIDs] : no use of NSAIDs [Transfusion Reaction] : no transfusion reaction [Impaired Immunity] : no impaired immunity [Steroid Use in Last 6 Months] : no steroid use in the last six months [Frequent Aspirin Use] : no frequent aspirin use [Prev Anesthesia Reaction] : no previous anesthesia reaction [Anesthesia Reaction] : no anesthesia reaction [Clotting Disorder] : no clotting disorder [Bleeding Disorder] : no bleeding disorder

## 2020-06-09 ENCOUNTER — APPOINTMENT (OUTPATIENT)
Dept: DISASTER EMERGENCY | Facility: CLINIC | Age: 50
End: 2020-06-09

## 2020-06-10 LAB — SARS-COV-2 N GENE NPH QL NAA+PROBE: NOT DETECTED

## 2020-06-11 ENCOUNTER — APPOINTMENT (OUTPATIENT)
Dept: PULMONOLOGY | Facility: CLINIC | Age: 50
End: 2020-06-11
Payer: COMMERCIAL

## 2020-06-11 VITALS — DIASTOLIC BLOOD PRESSURE: 78 MMHG | OXYGEN SATURATION: 97 % | HEART RATE: 93 BPM | SYSTOLIC BLOOD PRESSURE: 120 MMHG

## 2020-06-11 VITALS — WEIGHT: 315 LBS | HEIGHT: 70 IN | BODY MASS INDEX: 45.1 KG/M2

## 2020-06-11 DIAGNOSIS — E66.01 MORBID (SEVERE) OBESITY DUE TO EXCESS CALORIES: ICD-10-CM

## 2020-06-11 DIAGNOSIS — Z01.811 ENCOUNTER FOR PREPROCEDURAL RESPIRATORY EXAMINATION: ICD-10-CM

## 2020-06-11 DIAGNOSIS — Z01.818 ENCOUNTER FOR OTHER PREPROCEDURAL EXAMINATION: ICD-10-CM

## 2020-06-11 PROCEDURE — 99215 OFFICE O/P EST HI 40 MIN: CPT | Mod: 25

## 2020-06-11 PROCEDURE — 94727 GAS DIL/WSHOT DETER LNG VOL: CPT

## 2020-06-11 PROCEDURE — 85018 HEMOGLOBIN: CPT | Mod: QW

## 2020-06-11 PROCEDURE — 94010 BREATHING CAPACITY TEST: CPT

## 2020-06-11 PROCEDURE — 94729 DIFFUSING CAPACITY: CPT

## 2020-06-11 NOTE — PHYSICAL EXAM
[Normal Oropharynx] : normal oropharynx [No Acute Distress] : no acute distress [Neck Circumference: ___] : neck circumference: [unfilled] [Normal Appearance] : normal appearance [IV] : Mallampati Class: IV [No Neck Mass] : no neck mass [Normal Rate/Rhythm] : normal rate/rhythm [No Murmurs] : no murmurs [Normal S1, S2] : normal s1, s2 [No Abnormalities] : no abnormalities [Clear to Auscultation Bilaterally] : clear to auscultation bilaterally [No Resp Distress] : no resp distress [Normal Gait] : normal gait [Benign] : benign [No Cyanosis] : no cyanosis [No Clubbing] : no clubbing [No Edema] : no edema [Normal Color/ Pigmentation] : normal color/ pigmentation [FROM] : FROM [No Focal Deficits] : no focal deficits [Oriented x3] : oriented x3 [Normal Affect] : normal affect

## 2020-06-11 NOTE — CONSULT LETTER
[Dear  ___] : Dear  [unfilled], [Consult Letter:] : I had the pleasure of evaluating your patient, [unfilled]. [Please see my note below.] : Please see my note below. [Sincerely,] : Sincerely, [FreeTextEntry3] : [facility]ales [facility]

## 2020-06-11 NOTE — DISCUSSION/SUMMARY
[Obstructive Sleep Apnea] : obstructive sleep apnea [Responding to Treatment] : responding to treatment [Severe] : severe [CPAP Titration] : CPAP titration [Alcohol Avoidance] : alcohol avoidance [Smoking Cessation] : smoking cessation [Sedative Avoidance] : sedative avoidance [Weight Loss Program] : weight loss program [BiPAP] : BiPAP [Patient] : discussed with the patient [de-identified] : with central apnea [FreeTextEntry1] : No pulmonary/sleep contraindications to bariatric surgery [de-identified] : Patient compliant with CPAP/BiPAP and benefiting from therapy

## 2020-06-11 NOTE — REASON FOR VISIT
[Follow-Up] : a follow-up visit [Sleep Evaluation] : sleep evaluation [Pre-op Risk Stratification] : pre-op risk stratification

## 2020-06-11 NOTE — HISTORY OF PRESENT ILLNESS
[Obstructive Sleep Apnea] : obstructive sleep apnea [Snoring] : no snoring [Witnessed Apneas] : no witnessed sleep apnea [Frequent Nocturnal Awakening] : no nocturnal awakening [Daytime Somnolence] : no daytime somnolence [Unintentional Sleep while Active] : no unintentional sleep while active [Awakes Unrefreshed] : does not awake unrefreshed [Unintentional Sleep While Inactive] : no unintentional sleep while inactive [Awakening With Dry Mouth] : no dry mouth upon awakening [Awakes with Headache] : no headache upon awakening [Unusual Sleep Behavior] : no unusual sleep behavior [Recent  Weight Gain] : no recent weight gain [Cataplexy] : no cataplexy [Hypersomnolence] : no hypersomnolence [Sleep Paralysis] : no sleep paralysis [Hypnagogic Hallucinations] : no hallucinations when falling asleep [Hypnopompic Hallucinations] : no hallucinations when awakening [Arises: ___] : arises at [unfilled] [To Bed: ___] : ~he/she~ goes to bed at [unfilled] [Sleep Onset Latency: ___ minutes] : sleep onset latency of [unfilled] minutes reported [Nocturnal Awakenings: ___] : ~he/she~ typically has [unfilled] nocturnal awakenings [Date: ___] : Date of most recent diagnostic polysomnogram: [unfilled] [BPAP w/IPAP: ____ cmH2O] : BPAP with IPAP: [unfilled] cmH2O [AHI: ___ per hour] : Apnea-hypopnea index:  [unfilled] per hour [BPAP w/EPAP: ___ cmH2O] : BPAP with EPAP: [unfilled] cmH2O [% Days used: ____] : Days used: [unfilled] % [% Days used > 4 hrs: ____] : Days used > 4 hrs: [unfilled] % [Therapy based AHI: ___ /hr] : Therapy based AHI: [unfilled] / hr [FreeTextEntry1] : No SOB

## 2020-06-25 ENCOUNTER — OUTPATIENT (OUTPATIENT)
Dept: OUTPATIENT SERVICES | Facility: HOSPITAL | Age: 50
LOS: 1 days | End: 2020-06-25
Payer: COMMERCIAL

## 2020-06-25 VITALS
RESPIRATION RATE: 18 BRPM | OXYGEN SATURATION: 100 % | SYSTOLIC BLOOD PRESSURE: 134 MMHG | DIASTOLIC BLOOD PRESSURE: 71 MMHG | TEMPERATURE: 98 F | WEIGHT: 315 LBS | HEIGHT: 71 IN | HEART RATE: 88 BPM

## 2020-06-25 DIAGNOSIS — Z98.890 OTHER SPECIFIED POSTPROCEDURAL STATES: Chronic | ICD-10-CM

## 2020-06-25 DIAGNOSIS — Z01.818 ENCOUNTER FOR OTHER PREPROCEDURAL EXAMINATION: ICD-10-CM

## 2020-06-25 DIAGNOSIS — Z29.9 ENCOUNTER FOR PROPHYLACTIC MEASURES, UNSPECIFIED: ICD-10-CM

## 2020-06-25 LAB
A1C WITH ESTIMATED AVERAGE GLUCOSE RESULT: 8.4 % — HIGH (ref 4–5.6)
ALBUMIN SERPL ELPH-MCNC: 3.5 G/DL — SIGNIFICANT CHANGE UP (ref 3.3–5)
ANION GAP SERPL CALC-SCNC: 11 MMOL/L — SIGNIFICANT CHANGE UP (ref 5–17)
APPEARANCE UR: CLEAR — SIGNIFICANT CHANGE UP
APTT BLD: 22.9 SEC — LOW (ref 27.5–36.3)
BASOPHILS # BLD AUTO: 0.04 K/UL — SIGNIFICANT CHANGE UP (ref 0–0.2)
BASOPHILS NFR BLD AUTO: 0.6 % — SIGNIFICANT CHANGE UP (ref 0–2)
BILIRUB UR-MCNC: NEGATIVE — SIGNIFICANT CHANGE UP
BLOOD GAS SOURCE: SIGNIFICANT CHANGE UP
BUN SERPL-MCNC: 16 MG/DL — SIGNIFICANT CHANGE UP (ref 7–23)
CALCIUM SERPL-MCNC: 9.1 MG/DL — SIGNIFICANT CHANGE UP (ref 8.5–10.1)
CHLORIDE SERPL-SCNC: 104 MMOL/L — SIGNIFICANT CHANGE UP (ref 96–108)
CO2 SERPL-SCNC: 23 MMOL/L — SIGNIFICANT CHANGE UP (ref 22–31)
COHGB MFR BLDV: 2.2 % — HIGH (ref 0–1.5)
COLOR SPEC: YELLOW — SIGNIFICANT CHANGE UP
CREAT SERPL-MCNC: 1.31 MG/DL — HIGH (ref 0.5–1.3)
DIFF PNL FLD: NEGATIVE — SIGNIFICANT CHANGE UP
EOSINOPHIL # BLD AUTO: 0.19 K/UL — SIGNIFICANT CHANGE UP (ref 0–0.5)
EOSINOPHIL NFR BLD AUTO: 2.9 % — SIGNIFICANT CHANGE UP (ref 0–6)
ESTIMATED AVERAGE GLUCOSE: 194 MG/DL — HIGH (ref 68–114)
GLUCOSE SERPL-MCNC: 289 MG/DL — HIGH (ref 70–99)
GLUCOSE UR QL: 250 MG/DL
HCT VFR BLD CALC: 46.7 % — SIGNIFICANT CHANGE UP (ref 39–50)
HGB BLD CALC-MCNC: 15.9 G/DL — SIGNIFICANT CHANGE UP (ref 13–17)
HGB BLD-MCNC: 15.3 G/DL — SIGNIFICANT CHANGE UP (ref 13–17)
IMM GRANULOCYTES NFR BLD AUTO: 0.3 % — SIGNIFICANT CHANGE UP (ref 0–1.5)
INR BLD: 1.08 RATIO — SIGNIFICANT CHANGE UP (ref 0.88–1.16)
KETONES UR-MCNC: ABNORMAL
LEUKOCYTE ESTERASE UR-ACNC: NEGATIVE — SIGNIFICANT CHANGE UP
LYMPHOCYTES # BLD AUTO: 1.78 K/UL — SIGNIFICANT CHANGE UP (ref 1–3.3)
LYMPHOCYTES # BLD AUTO: 27.5 % — SIGNIFICANT CHANGE UP (ref 13–44)
MCHC RBC-ENTMCNC: 28 PG — SIGNIFICANT CHANGE UP (ref 27–34)
MCHC RBC-ENTMCNC: 32.8 GM/DL — SIGNIFICANT CHANGE UP (ref 32–36)
MCV RBC AUTO: 85.5 FL — SIGNIFICANT CHANGE UP (ref 80–100)
MONOCYTES # BLD AUTO: 0.44 K/UL — SIGNIFICANT CHANGE UP (ref 0–0.9)
MONOCYTES NFR BLD AUTO: 6.8 % — SIGNIFICANT CHANGE UP (ref 2–14)
NEUTROPHILS # BLD AUTO: 4.01 K/UL — SIGNIFICANT CHANGE UP (ref 1.8–7.4)
NEUTROPHILS NFR BLD AUTO: 61.9 % — SIGNIFICANT CHANGE UP (ref 43–77)
NITRITE UR-MCNC: NEGATIVE — SIGNIFICANT CHANGE UP
PH UR: 5 — SIGNIFICANT CHANGE UP (ref 5–8)
PLATELET # BLD AUTO: 147 K/UL — LOW (ref 150–400)
POTASSIUM SERPL-MCNC: 3.8 MMOL/L — SIGNIFICANT CHANGE UP (ref 3.5–5.3)
POTASSIUM SERPL-SCNC: 3.8 MMOL/L — SIGNIFICANT CHANGE UP (ref 3.5–5.3)
PROT UR-MCNC: NEGATIVE MG/DL — SIGNIFICANT CHANGE UP
PROTHROM AB SERPL-ACNC: 12 SEC — SIGNIFICANT CHANGE UP (ref 10–12.9)
RBC # BLD: 5.46 M/UL — SIGNIFICANT CHANGE UP (ref 4.2–5.8)
RBC # FLD: 13.5 % — SIGNIFICANT CHANGE UP (ref 10.3–14.5)
SODIUM SERPL-SCNC: 138 MMOL/L — SIGNIFICANT CHANGE UP (ref 135–145)
SP GR SPEC: 1.02 — SIGNIFICANT CHANGE UP (ref 1.01–1.02)
UROBILINOGEN FLD QL: NEGATIVE MG/DL — SIGNIFICANT CHANGE UP
WBC # BLD: 6.48 K/UL — SIGNIFICANT CHANGE UP (ref 3.8–10.5)
WBC # FLD AUTO: 6.48 K/UL — SIGNIFICANT CHANGE UP (ref 3.8–10.5)

## 2020-06-25 PROCEDURE — 36415 COLL VENOUS BLD VENIPUNCTURE: CPT

## 2020-06-25 PROCEDURE — 80048 BASIC METABOLIC PNL TOTAL CA: CPT

## 2020-06-25 PROCEDURE — 71046 X-RAY EXAM CHEST 2 VIEWS: CPT | Mod: 26

## 2020-06-25 PROCEDURE — 86901 BLOOD TYPING SEROLOGIC RH(D): CPT

## 2020-06-25 PROCEDURE — 71046 X-RAY EXAM CHEST 2 VIEWS: CPT

## 2020-06-25 PROCEDURE — G0463: CPT | Mod: 25

## 2020-06-25 PROCEDURE — 93005 ELECTROCARDIOGRAM TRACING: CPT

## 2020-06-25 PROCEDURE — 85610 PROTHROMBIN TIME: CPT

## 2020-06-25 PROCEDURE — 85025 COMPLETE CBC W/AUTO DIFF WBC: CPT

## 2020-06-25 PROCEDURE — 93010 ELECTROCARDIOGRAM REPORT: CPT

## 2020-06-25 PROCEDURE — 85730 THROMBOPLASTIN TIME PARTIAL: CPT

## 2020-06-25 PROCEDURE — 86850 RBC ANTIBODY SCREEN: CPT

## 2020-06-25 PROCEDURE — 83036 HEMOGLOBIN GLYCOSYLATED A1C: CPT

## 2020-06-25 PROCEDURE — 82040 ASSAY OF SERUM ALBUMIN: CPT

## 2020-06-25 PROCEDURE — 82375 ASSAY CARBOXYHB QUANT: CPT

## 2020-06-25 PROCEDURE — 81003 URINALYSIS AUTO W/O SCOPE: CPT

## 2020-06-25 PROCEDURE — 86900 BLOOD TYPING SEROLOGIC ABO: CPT

## 2020-06-25 NOTE — H&P PST ADULT - NSICDXFAMILYHX_GEN_ALL_CORE_FT
FAMILY HISTORY:  Family history of cerebrovascular accident (CVA) in mother, mother - HTN  FH: MI (myocardial infarction), father and brother  FH: rheumatoid arthritis, brother

## 2020-06-25 NOTE — H&P PST ADULT - ASSESSMENT
Plan  1. Stop all NSAIDS, herbal supplements and vitamins for 7 days.  2. NPO as per ASU instructions.  3. Take the following medications ( Levothyroxine, Exforge ) with small sips of water on the morning of your procedure/surgery.  4. Use EZ sponges as directed  5. Labs, EKG, CXR as per surgeon. COVID test on 2020, pt instructed.  6. PMD visit for optimization prior to surgery as per surgeon  7. Advised to quarantine prior to surgery    CAPRINI SCORE [CLOT]    AGE RELATED RISK FACTORS                                                       MOBILITY RELATED FACTORS  [x ] Age 41-60 years                                            (1 Point)                  [ ] Bed rest                                                        (1 Point)  [ ] Age: 61-74 years                                           (2 Points)                 [ ] Plaster cast                                                   (2 Points)  [ ] Age= 75 years                                              (3 Points)                 [ ] Bed bound for more than 72 hours                 (2 Points)    DISEASE RELATED RISK FACTORS                                               GENDER SPECIFIC FACTORS  [ ] Edema in the lower extremities                       (1 Point)                  [ ] Pregnancy                                                     (1 Point)  [ ] Varicose veins                                               (1 Point)                  [ ] Post-partum < 6 weeks                                   (1 Point)             [x ] BMI > 25 Kg/m2                                            (1 Point)                  [ ] Hormonal therapy  or oral contraception          (1 Point)                 [ ] Sepsis (in the previous month)                        (1 Point)                  [ ] History of pregnancy complications                 (1 point)  [ ] Pneumonia or serious lung disease                                               [ ] Unexplained or recurrent                     (1 Point)           (in the previous month)                               (1 Point)  [ ] Abnormal pulmonary function test                     (1 Point)                 SURGERY RELATED RISK FACTORS  [ ] Acute myocardial infarction                              (1 Point)                 [ ]  Section                                             (1 Point)  [ ] Congestive heart failure (in the previous month)  (1 Point)               [ ] Minor surgery                                                  (1 Point)   [ ] Inflammatory bowel disease                             (1 Point)                 [ ] Arthroscopic surgery                                        (2 Points)  [ ] Central venous access                                      (2 Points)                [x ] General surgery lasting more than 45 minutes   (2 Points)       [ ] Stroke (in the previous month)                          (5 Points)               [ ] Elective arthroplasty                                         (5 Points)     ()  malignancy                                                             (2 points )                                                                                                                                      HEMATOLOGY RELATED FACTORS                                                 TRAUMA RELATED RISK FACTORS  [ ] Prior episodes of VTE                                     (3 Points)                 [ ] Fracture of the hip, pelvis, or leg                       (5 Points)  [ ] Positive family history for VTE                         (3 Points)                 [ ] Acute spinal cord injury (in the previous month)  (5 Points)  [ ] Prothrombin 33966 A                                     (3 Points)                 [ ] Paralysis  (less than 1 month)                             (5 Points)  [ ] Factor V Leiden                                             (3 Points)                  [ ] Multiple Trauma within 1 month                        (5 Points)  [ ] Lupus anticoagulants                                     (3 Points)                                                           [ ] Anticardiolipin antibodies                               (3 Points)                                                       [ ] High homocysteine in the blood                      (3 Points)                                             [ ] Other congenital or acquired thrombophilia      (3 Points)                                                [ ] Heparin induced thrombocytopenia                  (3 Points)    (  ) Malignancy                                        Total Score [ 4         ]  The Caprini score indicates this patient is at risk for a VTE event (score 3-5).  Most surgical patients in this group would benefit from pharmacologic prophylaxis.  The surgical team will determine the balance between VTE risk and bleeding risk 51 y/o male with morbid obesity, DM, sleep apnea. Pt tried diet and exercise for weight loss without success. He is scheduled for Laparoscopic sleeve gastrectomy.   Plan  1. Stop all NSAIDS, herbal supplements and vitamins for 7 days.  2. NPO as per ASU instructions.  3. Take the following medications ( Levothyroxine, Exforge ) with small sips of water on the morning of your procedure/surgery.  4. Use EZ sponges as directed. Hold Metformin 24 hrs before surgery.  5. Labs, EKG, CXR as per surgeon. COVID test on 2020, pt instructed.  6. PMD visit for optimization prior to surgery as per surgeon  7. Advised to quarantine prior to surgery    CAPRINI SCORE [CLOT]    AGE RELATED RISK FACTORS                                                       MOBILITY RELATED FACTORS  [x ] Age 41-60 years                                            (1 Point)                  [ ] Bed rest                                                        (1 Point)  [ ] Age: 61-74 years                                           (2 Points)                 [ ] Plaster cast                                                   (2 Points)  [ ] Age= 75 years                                              (3 Points)                 [ ] Bed bound for more than 72 hours                 (2 Points)    DISEASE RELATED RISK FACTORS                                               GENDER SPECIFIC FACTORS  [ ] Edema in the lower extremities                       (1 Point)                  [ ] Pregnancy                                                     (1 Point)  [ ] Varicose veins                                               (1 Point)                  [ ] Post-partum < 6 weeks                                   (1 Point)             [x ] BMI > 25 Kg/m2                                            (1 Point)                  [ ] Hormonal therapy  or oral contraception          (1 Point)                 [ ] Sepsis (in the previous month)                        (1 Point)                  [ ] History of pregnancy complications                 (1 point)  [ ] Pneumonia or serious lung disease                                               [ ] Unexplained or recurrent                     (1 Point)           (in the previous month)                               (1 Point)  [ ] Abnormal pulmonary function test                     (1 Point)                 SURGERY RELATED RISK FACTORS  [ ] Acute myocardial infarction                              (1 Point)                 [ ]  Section                                             (1 Point)  [ ] Congestive heart failure (in the previous month)  (1 Point)               [ ] Minor surgery                                                  (1 Point)   [ ] Inflammatory bowel disease                             (1 Point)                 [ ] Arthroscopic surgery                                        (2 Points)  [ ] Central venous access                                      (2 Points)                [x ] General surgery lasting more than 45 minutes   (2 Points)       [ ] Stroke (in the previous month)                          (5 Points)               [ ] Elective arthroplasty                                         (5 Points)     ()  malignancy                                                             (2 points )                                                                                                                                      HEMATOLOGY RELATED FACTORS                                                 TRAUMA RELATED RISK FACTORS  [ ] Prior episodes of VTE                                     (3 Points)                 [ ] Fracture of the hip, pelvis, or leg                       (5 Points)  [ ] Positive family history for VTE                         (3 Points)                 [ ] Acute spinal cord injury (in the previous month)  (5 Points)  [ ] Prothrombin 49193 A                                     (3 Points)                 [ ] Paralysis  (less than 1 month)                             (5 Points)  [ ] Factor V Leiden                                             (3 Points)                  [ ] Multiple Trauma within 1 month                        (5 Points)  [ ] Lupus anticoagulants                                     (3 Points)                                                           [ ] Anticardiolipin antibodies                               (3 Points)                                                       [ ] High homocysteine in the blood                      (3 Points)                                             [ ] Other congenital or acquired thrombophilia      (3 Points)                                                [ ] Heparin induced thrombocytopenia                  (3 Points)    (  ) Malignancy                                        Total Score [ 4         ]  The Caprini score indicates this patient is at risk for a VTE event (score 3-5).  Most surgical patients in this group would benefit from pharmacologic prophylaxis.  The surgical team will determine the balance between VTE risk and bleeding risk

## 2020-06-25 NOTE — H&P PST ADULT - HEALTH CARE MAINTENANCE
Any history of  international or out of state travel in the last 21 days?   NO  Any close contact with a person who is under investigation fo COVID-19 or had close contact with a confirmed COVID -19 person in the last 14 days? Pt works for [employer]  Any fever, cough, SOB? NO

## 2020-06-25 NOTE — H&P PST ADULT - NSICDXPASTMEDICALHX_GEN_ALL_CORE_FT
PAST MEDICAL HISTORY:  Accessory skin tags     GERD (gastroesophageal reflux disease)     Hypertension     Hypothyroidism     Morbid obesity     PVCs (premature ventricular contractions)     Sleep apnea BIPAP

## 2020-06-25 NOTE — H&P PST ADULT - NSICDXPASTSURGICALHX_GEN_ALL_CORE_FT
PAST SURGICAL HISTORY:  H/O knee surgery left knee arthroscopy    H/O shoulder surgery right rotator repair    S/P colonoscopy     S/P endoscopy upper 02/2019

## 2020-06-25 NOTE — H&P PST ADULT - HISTORY OF PRESENT ILLNESS
49 y/o male with morbid obesity, DM, sleep apnea. Pt tried diet and exercise for weight loss without success. He is here for PST for planned Laparoscopic sleeve gastrectomy.

## 2020-06-26 DIAGNOSIS — Z01.818 ENCOUNTER FOR OTHER PREPROCEDURAL EXAMINATION: ICD-10-CM

## 2020-06-29 ENCOUNTER — OUTPATIENT (OUTPATIENT)
Dept: OUTPATIENT SERVICES | Facility: HOSPITAL | Age: 50
LOS: 1 days | End: 2020-06-29
Payer: COMMERCIAL

## 2020-06-29 DIAGNOSIS — Z98.890 OTHER SPECIFIED POSTPROCEDURAL STATES: Chronic | ICD-10-CM

## 2020-06-29 DIAGNOSIS — Z11.59 ENCOUNTER FOR SCREENING FOR OTHER VIRAL DISEASES: ICD-10-CM

## 2020-06-29 PROCEDURE — U0003: CPT

## 2020-06-30 DIAGNOSIS — Z11.59 ENCOUNTER FOR SCREENING FOR OTHER VIRAL DISEASES: ICD-10-CM

## 2020-06-30 LAB — SARS-COV-2 RNA SPEC QL NAA+PROBE: SIGNIFICANT CHANGE UP

## 2020-06-30 RX ORDER — ONDANSETRON 8 MG/1
4 TABLET, FILM COATED ORAL ONCE
Refills: 0 | Status: DISCONTINUED | OUTPATIENT
Start: 2020-07-01 | End: 2020-07-01

## 2020-06-30 RX ORDER — SODIUM CHLORIDE 9 MG/ML
1000 INJECTION, SOLUTION INTRAVENOUS
Refills: 0 | Status: DISCONTINUED | OUTPATIENT
Start: 2020-07-01 | End: 2020-07-01

## 2020-06-30 RX ORDER — SODIUM CHLORIDE 9 MG/ML
3 INJECTION INTRAMUSCULAR; INTRAVENOUS; SUBCUTANEOUS EVERY 8 HOURS
Refills: 0 | Status: DISCONTINUED | OUTPATIENT
Start: 2020-07-01 | End: 2020-07-02

## 2020-06-30 RX ORDER — HYDROMORPHONE HYDROCHLORIDE 2 MG/ML
0.5 INJECTION INTRAMUSCULAR; INTRAVENOUS; SUBCUTANEOUS
Refills: 0 | Status: DISCONTINUED | OUTPATIENT
Start: 2020-07-01 | End: 2020-07-01

## 2020-06-30 RX ORDER — FENTANYL CITRATE 50 UG/ML
25 INJECTION INTRAVENOUS
Refills: 0 | Status: DISCONTINUED | OUTPATIENT
Start: 2020-07-01 | End: 2020-07-01

## 2020-06-30 RX ORDER — FENTANYL CITRATE 50 UG/ML
50 INJECTION INTRAVENOUS
Refills: 0 | Status: DISCONTINUED | OUTPATIENT
Start: 2020-07-01 | End: 2020-07-01

## 2020-06-30 RX ORDER — MEPERIDINE HYDROCHLORIDE 50 MG/ML
12.5 INJECTION INTRAMUSCULAR; INTRAVENOUS; SUBCUTANEOUS
Refills: 0 | Status: DISCONTINUED | OUTPATIENT
Start: 2020-07-01 | End: 2020-07-01

## 2020-07-01 ENCOUNTER — INPATIENT (INPATIENT)
Facility: HOSPITAL | Age: 50
LOS: 0 days | Discharge: HOME | DRG: 621 | End: 2020-07-02
Attending: SURGERY | Admitting: SURGERY
Payer: COMMERCIAL

## 2020-07-01 ENCOUNTER — RESULT REVIEW (OUTPATIENT)
Age: 50
End: 2020-07-01

## 2020-07-01 VITALS
RESPIRATION RATE: 16 BRPM | HEIGHT: 71 IN | TEMPERATURE: 98 F | WEIGHT: 315 LBS | OXYGEN SATURATION: 99 % | HEART RATE: 80 BPM | SYSTOLIC BLOOD PRESSURE: 123 MMHG | DIASTOLIC BLOOD PRESSURE: 76 MMHG

## 2020-07-01 DIAGNOSIS — E66.01 MORBID (SEVERE) OBESITY DUE TO EXCESS CALORIES: ICD-10-CM

## 2020-07-01 DIAGNOSIS — Z98.890 OTHER SPECIFIED POSTPROCEDURAL STATES: Chronic | ICD-10-CM

## 2020-07-01 PROCEDURE — 80048 BASIC METABOLIC PNL TOTAL CA: CPT

## 2020-07-01 PROCEDURE — C1889: CPT

## 2020-07-01 PROCEDURE — C9113: CPT

## 2020-07-01 PROCEDURE — 82962 GLUCOSE BLOOD TEST: CPT

## 2020-07-01 PROCEDURE — 36415 COLL VENOUS BLD VENIPUNCTURE: CPT

## 2020-07-01 PROCEDURE — 88307 TISSUE EXAM BY PATHOLOGIST: CPT | Mod: 26

## 2020-07-01 PROCEDURE — 88307 TISSUE EXAM BY PATHOLOGIST: CPT

## 2020-07-01 PROCEDURE — 85025 COMPLETE CBC W/AUTO DIFF WBC: CPT

## 2020-07-01 PROCEDURE — C9290: CPT

## 2020-07-01 RX ORDER — LEVOTHYROXINE SODIUM 125 MCG
1 TABLET ORAL
Qty: 0 | Refills: 0 | DISCHARGE

## 2020-07-01 RX ORDER — KETOROLAC TROMETHAMINE 30 MG/ML
30 SYRINGE (ML) INJECTION EVERY 6 HOURS
Refills: 0 | Status: DISCONTINUED | OUTPATIENT
Start: 2020-07-01 | End: 2020-07-02

## 2020-07-01 RX ORDER — HEPARIN SODIUM 5000 [USP'U]/ML
5000 INJECTION INTRAVENOUS; SUBCUTANEOUS ONCE
Refills: 0 | Status: COMPLETED | OUTPATIENT
Start: 2020-07-01 | End: 2020-07-01

## 2020-07-01 RX ORDER — SODIUM CHLORIDE 9 MG/ML
1000 INJECTION, SOLUTION INTRAVENOUS
Refills: 0 | Status: DISCONTINUED | OUTPATIENT
Start: 2020-07-01 | End: 2020-07-02

## 2020-07-01 RX ORDER — DEXTROSE 50 % IN WATER 50 %
25 SYRINGE (ML) INTRAVENOUS ONCE
Refills: 0 | Status: DISCONTINUED | OUTPATIENT
Start: 2020-07-01 | End: 2020-07-02

## 2020-07-01 RX ORDER — ACETAMINOPHEN 500 MG
1000 TABLET ORAL EVERY 6 HOURS
Refills: 0 | Status: COMPLETED | OUTPATIENT
Start: 2020-07-01 | End: 2020-07-02

## 2020-07-01 RX ORDER — APREPITANT 80 MG/1
80 CAPSULE ORAL ONCE
Refills: 0 | Status: COMPLETED | OUTPATIENT
Start: 2020-07-01 | End: 2020-07-01

## 2020-07-01 RX ORDER — AMLODIPINE AND VALSARTAN 5; 320 MG/1; MG/1
1 TABLET, FILM COATED ORAL
Qty: 0 | Refills: 0 | DISCHARGE

## 2020-07-01 RX ORDER — DEXTROSE 50 % IN WATER 50 %
12.5 SYRINGE (ML) INTRAVENOUS ONCE
Refills: 0 | Status: DISCONTINUED | OUTPATIENT
Start: 2020-07-01 | End: 2020-07-02

## 2020-07-01 RX ORDER — ONDANSETRON 8 MG/1
4 TABLET, FILM COATED ORAL EVERY 6 HOURS
Refills: 0 | Status: DISCONTINUED | OUTPATIENT
Start: 2020-07-01 | End: 2020-07-02

## 2020-07-01 RX ORDER — ENOXAPARIN SODIUM 100 MG/ML
40 INJECTION SUBCUTANEOUS EVERY 12 HOURS
Refills: 0 | Status: DISCONTINUED | OUTPATIENT
Start: 2020-07-01 | End: 2020-07-02

## 2020-07-01 RX ORDER — GLUCAGON INJECTION, SOLUTION 0.5 MG/.1ML
1 INJECTION, SOLUTION SUBCUTANEOUS ONCE
Refills: 0 | Status: DISCONTINUED | OUTPATIENT
Start: 2020-07-01 | End: 2020-07-02

## 2020-07-01 RX ORDER — PANTOPRAZOLE SODIUM 20 MG/1
40 TABLET, DELAYED RELEASE ORAL EVERY 24 HOURS
Refills: 0 | Status: DISCONTINUED | OUTPATIENT
Start: 2020-07-01 | End: 2020-07-02

## 2020-07-01 RX ORDER — OXYCODONE HYDROCHLORIDE 5 MG/1
10 TABLET ORAL EVERY 4 HOURS
Refills: 0 | Status: DISCONTINUED | OUTPATIENT
Start: 2020-07-01 | End: 2020-07-02

## 2020-07-01 RX ORDER — LEVOTHYROXINE SODIUM 125 MCG
25 TABLET ORAL AT BEDTIME
Refills: 0 | Status: DISCONTINUED | OUTPATIENT
Start: 2020-07-01 | End: 2020-07-02

## 2020-07-01 RX ORDER — INSULIN LISPRO 100/ML
VIAL (ML) SUBCUTANEOUS EVERY 6 HOURS
Refills: 0 | Status: DISCONTINUED | OUTPATIENT
Start: 2020-07-01 | End: 2020-07-02

## 2020-07-01 RX ORDER — OXYCODONE HYDROCHLORIDE 5 MG/1
5 TABLET ORAL EVERY 4 HOURS
Refills: 0 | Status: DISCONTINUED | OUTPATIENT
Start: 2020-07-01 | End: 2020-07-02

## 2020-07-01 RX ORDER — DEXTROSE 50 % IN WATER 50 %
15 SYRINGE (ML) INTRAVENOUS ONCE
Refills: 0 | Status: DISCONTINUED | OUTPATIENT
Start: 2020-07-01 | End: 2020-07-02

## 2020-07-01 RX ADMIN — FENTANYL CITRATE 50 MICROGRAM(S): 50 INJECTION INTRAVENOUS at 12:40

## 2020-07-01 RX ADMIN — SODIUM CHLORIDE 150 MILLILITER(S): 9 INJECTION, SOLUTION INTRAVENOUS at 16:20

## 2020-07-01 RX ADMIN — HYDROMORPHONE HYDROCHLORIDE 0.5 MILLIGRAM(S): 2 INJECTION INTRAMUSCULAR; INTRAVENOUS; SUBCUTANEOUS at 13:04

## 2020-07-01 RX ADMIN — FENTANYL CITRATE 50 MICROGRAM(S): 50 INJECTION INTRAVENOUS at 12:50

## 2020-07-01 RX ADMIN — SODIUM CHLORIDE 150 MILLILITER(S): 9 INJECTION, SOLUTION INTRAVENOUS at 12:34

## 2020-07-01 RX ADMIN — HYDROMORPHONE HYDROCHLORIDE 0.5 MILLIGRAM(S): 2 INJECTION INTRAMUSCULAR; INTRAVENOUS; SUBCUTANEOUS at 13:15

## 2020-07-01 RX ADMIN — Medication 2: at 23:01

## 2020-07-01 RX ADMIN — SODIUM CHLORIDE 3 MILLILITER(S): 9 INJECTION INTRAMUSCULAR; INTRAVENOUS; SUBCUTANEOUS at 14:42

## 2020-07-01 RX ADMIN — Medication 30 MILLIGRAM(S): at 17:56

## 2020-07-01 RX ADMIN — SODIUM CHLORIDE 3 MILLILITER(S): 9 INJECTION INTRAMUSCULAR; INTRAVENOUS; SUBCUTANEOUS at 21:15

## 2020-07-01 RX ADMIN — Medication 400 MILLIGRAM(S): at 17:58

## 2020-07-01 RX ADMIN — PANTOPRAZOLE SODIUM 40 MILLIGRAM(S): 20 TABLET, DELAYED RELEASE ORAL at 12:32

## 2020-07-01 RX ADMIN — APREPITANT 80 MILLIGRAM(S): 80 CAPSULE ORAL at 08:32

## 2020-07-01 RX ADMIN — HEPARIN SODIUM 5000 UNIT(S): 5000 INJECTION INTRAVENOUS; SUBCUTANEOUS at 08:32

## 2020-07-01 RX ADMIN — ENOXAPARIN SODIUM 40 MILLIGRAM(S): 100 INJECTION SUBCUTANEOUS at 21:15

## 2020-07-01 RX ADMIN — Medication 2: at 18:16

## 2020-07-01 NOTE — ASU PREOP CHECKLIST - TEMPERATURE IN CELSIUS (DEGREES C)
Anesthesia Evaluation     Patient summary reviewed and Nursing notes reviewed   history of anesthetic complications: PONV  NPO Solid Status: > 8 hours  NPO Liquid Status: > 8 hours           Airway   Mallampati: II  TM distance: >3 FB  Neck ROM: full  Dental      Pulmonary    (+) asthma,  Cardiovascular - negative cardio ROS        Neuro/Psych  (+) psychiatric history Anxiety,     GI/Hepatic/Renal/Endo    (+) obesity,   diabetes mellitus gestational,     Musculoskeletal (-) negative ROS    Abdominal    Substance History - negative use     OB/GYN    (+) Pregnant,         Other                        Anesthesia Plan    ASA 2     spinal and ITN       Anesthetic plan, all risks, benefits, and alternatives have been provided, discussed and informed consent has been obtained with: patient.      
36.6

## 2020-07-01 NOTE — BRIEF OPERATIVE NOTE - OPERATION/FINDINGS
Patient underwent laparoscopic sleeve gastrectomy over 38 Yi bougie without any complications. Intraoperative EGD confirmed intact nonbleeding staple line with negative leak test.

## 2020-07-01 NOTE — CHART NOTE - NSCHARTNOTEFT_GEN_A_CORE
Surgery date: 7/1/20    Pre-Operative Diagnosis: Refractory morbid obesity with multiple comorbid conditions.    Post-operative Diagnosis: Same    Primary Procedure  [35545] Lap Longitudinal Gastrectomy     Secondary Procedure(s)  [05949] Bilateral TAP Block  [29147] Uppr Gi Endoscopy, Diagnosis     Surgeon(s)  Surgeon:  Edu Holland MD  Assistant surgeon: Pratik Garcia MD    Anesthesia type: General Anesthesia     Specimens:  partial gastrectomy sent to pathology     Estimated blood loss: 30 ml     DRAINS: NONE    COMPLICATIONS: NONE     INDICATIONS OF THE PROCEDURE: The patient is a -year-old female who is morbidly obese with a body mass index of . The patient has multiple comorbidities due to her morbid obesity including . The patient has failed multiple nonoperative attempts at weight loss. The patient meets NIH criteria for bariatric surgery and underwent appropriate preoperative workup, education, and signed the consent form freely. The patient had risks and benefits explained including, but not limited to, bleeding, leak, infection, disability, injury to transient structures, aspiration, DVT, pulmonary embolism, and death. The patient understood and agreed to proceed with surgery.     DESCRIPTION OF PROCEDURE: The patient was identified properly via a time-out. The patient was brought into the operating room and was placed on the operating room table in supine position. The patient was then given general endotracheal anesthesia and was given preoperative antibiotics. Preoperative heparin was given in the ambulatory surgery unit. The patient was then prepped and draped in the usual sterile fashion. An Exparel mixture was mixed at the back table with 20 mL of Exparel, 30 mL of 0.25% Marcaine and 150 mL of normal saline. A Veress needle was placed in the left upper quadrant. The abdomen was insufflated to 15 millimeters of mercury. Once the abdomen was insufflated, the Exparel mixture was given subcutaneously at the sites of incision. An incision was made within the umbilicus and a 12-millimeter trocar was placed in the abdomen. The laparoscope was inserted and there was no injury on initial trocar placement or initial Veress needle placement. A Transversus Abdominus Plane Block was then conducted by placing 20 mL of the Exparel mixture preperitoneal at the distribution of the spinal nerves on the lateral abdominal wall. This was started at the costal margin at the mid axillary line. 20cc of the Exparel mixture was placed in this area. Another 20 mL was placed four centimeters caudal to this area. Another 20 mL was placed four centimeters caudal to this area and another 15 mL was placed four centimeters caudal to this area. This was repeated on the opposite side of the body in a similar fashion. The rest of the Exparel was placed into the subcutaneous tissues and preperitoneal at every trocar site. A 5-millimeter and 15-millimeter trocar was placed in the right upper quadrant. A 12-millimeter and 5-millimeter trocar was placed in left upper quadrant. An incision was made in subxiphoid area and a liver retractor was placed to hold the liver anteriorly and superiorly and was held in place using Mediflex device. The patient was then placed into steep reverse Trendelenburg position and a point along the stomach was then measured approximately 5 centimeters proximal to the pylorus and the greater curvature of the stomach was taken down from that point. The greater curvature was taken down all the way to the angle of His and the stomach was removed from the left crura using the Ligasure device. At this point, the stomach was completely mobilized. A 40-Kiswahili bougie was then placed into the stomach and placed into the antrum. At this point a 60 millimeter iDrive stapler with a tri-staple black load was used to start transecting the stomach along the bougie, approximately 5 more firings using the purple load were used to staple the stomach to wrap around the bougie to make a nice sleeve around the bougie. Once the stomach was completely transected, the stomach was placed into an EndoCatch bag and removed from the abdomen. A Vicryl suture was used to oversew the staple line in a Lembert fashion from the top of the suture line to about group home down the sleeve gastrectomy in a continuous running fashion. Two sutures, 3-0 sutures were placed in the antrum of the stomach to the omental fat so that the sleeve would not rotate. Hemostasis was achieved, the remaining stomach was placed under saline solution and an endoscopy was then conducted. There was no leak from the staple line. There was no bleeding or stricture at the staple line. Once the endoscopy was completed, complete hemostasis was assured. The 15-millimeter trocar was then removed and the fascia was closed with a 0 Vicryl suture using the suture passer. Upon completion of the procedure, the abdomen was desufflated and all trocars were removed. The skin was closed with 4-0 Monocryl running subcuticular sutures. The patient tolerated the procedure well.    Disposition  To recovery room, VS stable.

## 2020-07-01 NOTE — BRIEF OPERATIVE NOTE - NSICDXBRIEFPROCEDURE_GEN_ALL_CORE_FT
PROCEDURES:  Bilateral injection of anesthetic agent into tissue plane defined by transversus abdominis muscle 01-Jul-2020 11:56:49  Pratik Garcia  Gastrectomy, sleeve, laparoscopic, with EDG 01-Jul-2020 11:56:44  Pratik Garcia

## 2020-07-02 ENCOUNTER — TRANSCRIPTION ENCOUNTER (OUTPATIENT)
Age: 50
End: 2020-07-02

## 2020-07-02 VITALS
OXYGEN SATURATION: 99 % | RESPIRATION RATE: 18 BRPM | DIASTOLIC BLOOD PRESSURE: 50 MMHG | TEMPERATURE: 98 F | SYSTOLIC BLOOD PRESSURE: 103 MMHG | HEART RATE: 74 BPM

## 2020-07-02 DIAGNOSIS — E66.01 MORBID (SEVERE) OBESITY DUE TO EXCESS CALORIES: ICD-10-CM

## 2020-07-02 LAB
ANION GAP SERPL CALC-SCNC: 7 MMOL/L — SIGNIFICANT CHANGE UP (ref 5–17)
BASOPHILS # BLD AUTO: 0.01 K/UL — SIGNIFICANT CHANGE UP (ref 0–0.2)
BASOPHILS NFR BLD AUTO: 0.1 % — SIGNIFICANT CHANGE UP (ref 0–2)
BUN SERPL-MCNC: 12 MG/DL — SIGNIFICANT CHANGE UP (ref 7–23)
CALCIUM SERPL-MCNC: 8.8 MG/DL — SIGNIFICANT CHANGE UP (ref 8.5–10.1)
CHLORIDE SERPL-SCNC: 104 MMOL/L — SIGNIFICANT CHANGE UP (ref 96–108)
CO2 SERPL-SCNC: 25 MMOL/L — SIGNIFICANT CHANGE UP (ref 22–31)
CREAT SERPL-MCNC: 1.06 MG/DL — SIGNIFICANT CHANGE UP (ref 0.5–1.3)
EOSINOPHIL # BLD AUTO: 0.01 K/UL — SIGNIFICANT CHANGE UP (ref 0–0.5)
EOSINOPHIL NFR BLD AUTO: 0.1 % — SIGNIFICANT CHANGE UP (ref 0–6)
GLUCOSE SERPL-MCNC: 179 MG/DL — HIGH (ref 70–99)
HCT VFR BLD CALC: 45.6 % — SIGNIFICANT CHANGE UP (ref 39–50)
HGB BLD-MCNC: 15.5 G/DL — SIGNIFICANT CHANGE UP (ref 13–17)
IMM GRANULOCYTES NFR BLD AUTO: 0.4 % — SIGNIFICANT CHANGE UP (ref 0–1.5)
LYMPHOCYTES # BLD AUTO: 1.21 K/UL — SIGNIFICANT CHANGE UP (ref 1–3.3)
LYMPHOCYTES # BLD AUTO: 9.9 % — LOW (ref 13–44)
MCHC RBC-ENTMCNC: 28.4 PG — SIGNIFICANT CHANGE UP (ref 27–34)
MCHC RBC-ENTMCNC: 34 GM/DL — SIGNIFICANT CHANGE UP (ref 32–36)
MCV RBC AUTO: 83.7 FL — SIGNIFICANT CHANGE UP (ref 80–100)
MONOCYTES # BLD AUTO: 0.77 K/UL — SIGNIFICANT CHANGE UP (ref 0–0.9)
MONOCYTES NFR BLD AUTO: 6.3 % — SIGNIFICANT CHANGE UP (ref 2–14)
NEUTROPHILS # BLD AUTO: 10.15 K/UL — HIGH (ref 1.8–7.4)
NEUTROPHILS NFR BLD AUTO: 83.2 % — HIGH (ref 43–77)
PLATELET # BLD AUTO: 188 K/UL — SIGNIFICANT CHANGE UP (ref 150–400)
POTASSIUM SERPL-MCNC: 3.6 MMOL/L — SIGNIFICANT CHANGE UP (ref 3.5–5.3)
POTASSIUM SERPL-SCNC: 3.6 MMOL/L — SIGNIFICANT CHANGE UP (ref 3.5–5.3)
RBC # BLD: 5.45 M/UL — SIGNIFICANT CHANGE UP (ref 4.2–5.8)
RBC # FLD: 13.4 % — SIGNIFICANT CHANGE UP (ref 10.3–14.5)
SODIUM SERPL-SCNC: 136 MMOL/L — SIGNIFICANT CHANGE UP (ref 135–145)
WBC # BLD: 12.2 K/UL — HIGH (ref 3.8–10.5)
WBC # FLD AUTO: 12.2 K/UL — HIGH (ref 3.8–10.5)

## 2020-07-02 RX ORDER — METFORMIN HYDROCHLORIDE 850 MG/1
1 TABLET ORAL
Qty: 0 | Refills: 0 | DISCHARGE

## 2020-07-02 RX ORDER — GLIMEPIRIDE 1 MG
1 TABLET ORAL
Qty: 0 | Refills: 0 | DISCHARGE

## 2020-07-02 RX ADMIN — Medication 400 MILLIGRAM(S): at 00:56

## 2020-07-02 RX ADMIN — ENOXAPARIN SODIUM 40 MILLIGRAM(S): 100 INJECTION SUBCUTANEOUS at 09:21

## 2020-07-02 RX ADMIN — Medication 1: at 06:31

## 2020-07-02 RX ADMIN — Medication 30 MILLIGRAM(S): at 00:06

## 2020-07-02 RX ADMIN — PANTOPRAZOLE SODIUM 40 MILLIGRAM(S): 20 TABLET, DELAYED RELEASE ORAL at 09:21

## 2020-07-02 RX ADMIN — Medication 400 MILLIGRAM(S): at 12:32

## 2020-07-02 RX ADMIN — Medication 30 MILLIGRAM(S): at 12:32

## 2020-07-02 RX ADMIN — SODIUM CHLORIDE 3 MILLILITER(S): 9 INJECTION INTRAMUSCULAR; INTRAVENOUS; SUBCUTANEOUS at 06:25

## 2020-07-02 RX ADMIN — Medication 30 MILLIGRAM(S): at 06:33

## 2020-07-02 RX ADMIN — Medication 400 MILLIGRAM(S): at 06:33

## 2020-07-02 NOTE — PROGRESS NOTE ADULT - SUBJECTIVE AND OBJECTIVE BOX
Post Op Day#: 1  Procedure:  Laparoscopic Sleeve Gastrectomy    The patient is doing well without complaints.    Vital Signs Last 24 Hrs  T(C): 36.7 (02 Jul 2020 09:45), Max: 36.8 (02 Jul 2020 04:05)  T(F): 98 (02 Jul 2020 09:45), Max: 98.2 (02 Jul 2020 04:05)  HR: 74 (02 Jul 2020 09:45) (73 - 93)  BP: 103/50 (02 Jul 2020 09:45) (103/50 - 147/77)  BP(mean): --  RR: 18 (02 Jul 2020 09:45) (12 - 20)  SpO2: 99% (02 Jul 2020 09:45) (98% - 100%)    PHYSICAL EXAM:  General: NAD.  HEENT: no JVD, no jaundice.  LUNGS: CTAB.  Heart: S1 S2 RRR  Abd: soft nt/nd   Wounds: clean dry and intact                          15.5   12.20 )-----------( 188      ( 02 Jul 2020 07:00 )             45.6       07-02    136  |  104  |  12  ----------------------------<  179<H>  3.6   |  25  |  1.06    Ca    8.8      02 Jul 2020 07:00

## 2020-07-02 NOTE — DISCHARGE NOTE PROVIDER - CARE PROVIDER_API CALL
Edu Holland  SURGERY  [address] [place] [address]  Phone: [phone]  Fax: [phone]  Follow Up Time:

## 2020-07-02 NOTE — PROGRESS NOTE ADULT - PROBLEM SELECTOR PLAN 1
The patient is s/p lap sleeve gastrectomy and doing very well.  All discharge instructions were given to the patient, as well as potential signs of complications.  The patient will follow up in 2 weeks.  [facility]

## 2020-07-02 NOTE — DISCHARGE NOTE PROVIDER - NSDCMRMEDTOKEN_GEN_ALL_CORE_FT
Exforge 10 mg-320 mg oral tablet: 1 tab(s) orally once a day  levothyroxine 50 mcg (0.05 mg) oral tablet: 1 tab(s) orally once a day

## 2020-07-02 NOTE — DISCHARGE NOTE PROVIDER - HOSPITAL COURSE
Patient is an 49 yo M that underwent laparoscopic sleeve gastrectomy without any complications. Patient is currently doing very well, pain controlled, and is toelrating phase I bariatric diet. Patient has had uncomplicated hospital course and is stable for discharge home with follow-up in the office in 2 weeks.

## 2020-07-02 NOTE — DISCHARGE NOTE NURSING/CASE MANAGEMENT/SOCIAL WORK - PATIENT PORTAL LINK FT
You can access the FollowMyHealth Patient Portal offered by [facility] by registering at the following website: [email or web address]. By joining [facility]’s FollowMyHealth portal, you will also be able to view your health information using other applications (apps) compatible with our system.

## 2020-07-06 DIAGNOSIS — E66.01 MORBID (SEVERE) OBESITY DUE TO EXCESS CALORIES: ICD-10-CM

## 2020-07-18 PROBLEM — Q82.8 OTHER SPECIFIED CONGENITAL MALFORMATIONS OF SKIN: Chronic | Status: ACTIVE | Noted: 2020-06-25

## 2020-07-18 PROBLEM — G47.30 SLEEP APNEA, UNSPECIFIED: Chronic | Status: ACTIVE | Noted: 2020-06-25

## 2020-07-18 PROBLEM — E66.01 MORBID (SEVERE) OBESITY DUE TO EXCESS CALORIES: Chronic | Status: ACTIVE | Noted: 2020-06-25

## 2020-07-18 PROBLEM — I49.3 VENTRICULAR PREMATURE DEPOLARIZATION: Chronic | Status: ACTIVE | Noted: 2020-06-25

## 2020-07-18 PROBLEM — K21.9 GASTRO-ESOPHAGEAL REFLUX DISEASE WITHOUT ESOPHAGITIS: Chronic | Status: ACTIVE | Noted: 2020-06-25

## 2020-08-27 ENCOUNTER — OUTPATIENT (OUTPATIENT)
Dept: OUTPATIENT SERVICES | Facility: HOSPITAL | Age: 50
LOS: 1 days | End: 2020-08-27
Payer: COMMERCIAL

## 2020-08-27 ENCOUNTER — APPOINTMENT (OUTPATIENT)
Dept: MRI IMAGING | Facility: CLINIC | Age: 50
End: 2020-08-27
Payer: COMMERCIAL

## 2020-08-27 DIAGNOSIS — Z00.00 ENCOUNTER FOR GENERAL ADULT MEDICAL EXAMINATION WITHOUT ABNORMAL FINDINGS: ICD-10-CM

## 2020-08-27 DIAGNOSIS — Z98.890 OTHER SPECIFIED POSTPROCEDURAL STATES: Chronic | ICD-10-CM

## 2020-08-27 DIAGNOSIS — M54.5 LOW BACK PAIN: ICD-10-CM

## 2020-08-27 PROCEDURE — 72148 MRI LUMBAR SPINE W/O DYE: CPT | Mod: 26

## 2020-08-27 PROCEDURE — 72148 MRI LUMBAR SPINE W/O DYE: CPT

## 2020-09-11 ENCOUNTER — APPOINTMENT (OUTPATIENT)
Dept: UROLOGY | Facility: CLINIC | Age: 50
End: 2020-09-11
Payer: COMMERCIAL

## 2020-09-11 ENCOUNTER — APPOINTMENT (OUTPATIENT)
Dept: UROLOGY | Facility: CLINIC | Age: 50
End: 2020-09-11

## 2020-09-11 VITALS
SYSTOLIC BLOOD PRESSURE: 156 MMHG | RESPIRATION RATE: 17 BRPM | HEIGHT: 70 IN | BODY MASS INDEX: 44.81 KG/M2 | DIASTOLIC BLOOD PRESSURE: 83 MMHG | HEART RATE: 69 BPM | WEIGHT: 313 LBS

## 2020-09-11 VITALS — TEMPERATURE: 98.7 F

## 2020-09-11 DIAGNOSIS — E34.9 ENDOCRINE DISORDER, UNSPECIFIED: ICD-10-CM

## 2020-09-11 PROCEDURE — 99205 OFFICE O/P NEW HI 60 MIN: CPT

## 2020-09-11 NOTE — ASSESSMENT
[FreeTextEntry1] : ED\par hx of low testosterone \par obesity \par will repeat T today \par check LH and FSH\par \par will try cialis 20 mg twice a week \par discussed use and effect of stress and low T on ED\par \par bring back for DUS \par \par once second T available \par start T \par Depo v topical v sq discussed \par will draw PSA today as well \par \par \par The submitted E/M billing level for this visit reflects the total time spent on the day of the visit including face-to-face time spent with the patient, non-face-to-face review of medical records and relevant information, documentation, and asynchronous communication with the patient after a visit via phone, email, or patient’s EHR portal after the visit.  \par The medical records reviewed are either scanned into the chart or reviewed with the patient using a patient’s electronic medical records portal for patients with records not available to [facility] via electronic transmission platforms from other institutions and labs. \par Time spend counseling and performing coordination of care was also included in determining the appropriate EM billing level.\par \par I have reviewed and verified information regarding the chief complaint and history recorded by the ancillary staff and/or the patient. I have independently reviewed and interpreted tests performed by other physicians and facilities as necessary. \par \par I have discussed with the patient differential diagnosis, reason for auxiliary tests if ordered, risks, benefits, alternatives, and complications of each form of therapy were discussed.\par \par Total time of visit 60 min,  greater than 50% of that time was spent in counseling patient about the problems listed in plan and coordinating of care. Specifically causes, evaluation, treatment options, risks, complications, and benefits of  available therapies were discussed. Questions were answered.

## 2020-09-11 NOTE — HISTORY OF PRESENT ILLNESS
[FreeTextEntry1] : ED\par had low T \par gastric sleeve surgery July 1st 2020 \par \par lost 63 pounds\par \par when masturbating able to sustain fair erection \par not able to penetrate with his partner \par \par \par hx of low T but never treated\par \par never tried to take any medx\par \par

## 2020-09-18 LAB
ESTRADIOL SERPL-MCNC: 45 PG/ML
FSH SERPL-MCNC: 8.1 IU/L
LH SERPL-ACNC: 9.6 IU/L
PROLACTIN SERPL-MCNC: 9.1 NG/ML
TESTOST BND SERPL-MCNC: 5.6 PG/ML
TESTOST SERPL-MCNC: 419.3 NG/DL

## 2020-10-09 ENCOUNTER — APPOINTMENT (OUTPATIENT)
Dept: UROLOGY | Facility: CLINIC | Age: 50
End: 2020-10-09
Payer: COMMERCIAL

## 2020-10-09 VITALS — TEMPERATURE: 97.2 F

## 2020-10-09 DIAGNOSIS — N52.9 MALE ERECTILE DYSFUNCTION, UNSPECIFIED: ICD-10-CM

## 2020-10-09 PROCEDURE — 99213 OFFICE O/P EST LOW 20 MIN: CPT

## 2020-10-09 NOTE — ASSESSMENT
[FreeTextEntry1] : No need for DUS \par erectile dysfunction much better \par continue cialis 20 mg \par follow up in 6 m\par \par The submitted E/M billing level for this visit reflects the total time spent on the day of the visit including face-to-face time spent with the patient, non-face-to-face review of medical records and relevant information, documentation, and asynchronous communication with the patient after a visit via phone, email, or patient’s EHR portal after the visit.  \par The medical records reviewed are either scanned into the chart or reviewed with the patient using a patient’s electronic medical records portal for patients with records not available to [facility] via electronic transmission platforms from other institutions and labs. \par Time spend counseling and performing coordination of care was also included in determining the appropriate EM billing level.\par \par I have reviewed and verified information regarding the chief complaint and history recorded by the ancillary staff and/or the patient. I have independently reviewed and interpreted tests performed by other physicians and facilities as necessary. \par \par I have discussed with the patient differential diagnosis, reason for auxiliary tests if ordered, risks, benefits, alternatives, and complications of each form of therapy were discussed.\par

## 2020-10-09 NOTE — HISTORY OF PRESENT ILLNESS
[FreeTextEntry1] : Here for follow up of ED\par erections are much better\par repeated testosterone is normal \par most likely due to loss of weight

## 2020-10-16 ENCOUNTER — TRANSCRIPTION ENCOUNTER (OUTPATIENT)
Age: 50
End: 2020-10-16

## 2020-10-30 ENCOUNTER — APPOINTMENT (OUTPATIENT)
Dept: ENDOCRINOLOGY | Facility: CLINIC | Age: 50
End: 2020-10-30
Payer: COMMERCIAL

## 2020-10-30 VITALS
HEART RATE: 68 BPM | DIASTOLIC BLOOD PRESSURE: 80 MMHG | SYSTOLIC BLOOD PRESSURE: 140 MMHG | HEIGHT: 70 IN | OXYGEN SATURATION: 98 % | WEIGHT: 295 LBS | BODY MASS INDEX: 42.23 KG/M2

## 2020-10-30 DIAGNOSIS — E03.9 HYPOTHYROIDISM, UNSPECIFIED: ICD-10-CM

## 2020-10-30 LAB — GLUCOSE BLDC GLUCOMTR-MCNC: 92

## 2020-10-30 PROCEDURE — 82962 GLUCOSE BLOOD TEST: CPT

## 2020-10-30 PROCEDURE — 99213 OFFICE O/P EST LOW 20 MIN: CPT | Mod: 25

## 2020-10-30 PROCEDURE — 99072 ADDL SUPL MATRL&STAF TM PHE: CPT

## 2020-10-30 NOTE — HISTORY OF PRESENT ILLNESS
[FreeTextEntry1] : History of diabetes for 2 years, found on routine lab tests. s/p gastric sleeve, with 80 pounds of weight loss and normalization of glucose levels without meds. Testosterone also normalized\par Prior history\par Past h/o chronic obesity, peak weight around 380 pounds, with some recent weight loss due to dietary changes. In addition to diabetes, the obesity has been complicated by sleep apnea and hypertriglyceridemia.\par He reports improvement in his A1C level from 10.3% to the latest 9.7%.  \par HIstory of hypothyroidism, also for 2 years, on synthroid .05 mg.\par \par started on metformin  bid and glimepiride 2 bid

## 2020-12-10 ENCOUNTER — TRANSCRIPTION ENCOUNTER (OUTPATIENT)
Age: 50
End: 2020-12-10

## 2020-12-14 ENCOUNTER — APPOINTMENT (OUTPATIENT)
Dept: DISASTER EMERGENCY | Facility: CLINIC | Age: 50
End: 2020-12-14

## 2020-12-15 LAB — SARS-COV-2 N GENE NPH QL NAA+PROBE: NOT DETECTED

## 2020-12-21 ENCOUNTER — APPOINTMENT (OUTPATIENT)
Dept: PULMONOLOGY | Facility: CLINIC | Age: 50
End: 2020-12-21
Payer: COMMERCIAL

## 2020-12-21 VITALS
DIASTOLIC BLOOD PRESSURE: 74 MMHG | BODY MASS INDEX: 41.61 KG/M2 | OXYGEN SATURATION: 98 % | HEART RATE: 64 BPM | SYSTOLIC BLOOD PRESSURE: 141 MMHG | WEIGHT: 290 LBS

## 2020-12-21 DIAGNOSIS — G47.33 OBSTRUCTIVE SLEEP APNEA (ADULT) (PEDIATRIC): ICD-10-CM

## 2020-12-21 PROCEDURE — 99072 ADDL SUPL MATRL&STAF TM PHE: CPT

## 2020-12-21 PROCEDURE — 99214 OFFICE O/P EST MOD 30 MIN: CPT

## 2020-12-21 RX ORDER — GLIMEPIRIDE 2 MG/1
2 TABLET ORAL
Qty: 180 | Refills: 3 | Status: DISCONTINUED | COMMUNITY
Start: 2019-02-11 | End: 2020-12-21

## 2020-12-21 RX ORDER — TADALAFIL 20 MG/1
20 TABLET ORAL
Qty: 30 | Refills: 6 | Status: DISCONTINUED | OUTPATIENT
Start: 2020-09-11 | End: 2020-12-21

## 2020-12-21 RX ORDER — METFORMIN ER 750 MG 750 MG/1
750 TABLET ORAL
Qty: 180 | Refills: 3 | Status: DISCONTINUED | COMMUNITY
Start: 2018-10-24 | End: 2020-12-21

## 2020-12-21 NOTE — CONSULT LETTER
[Dear  ___] : Dear  [unfilled], [Consult Letter:] : I had the pleasure of evaluating your patient, [unfilled]. [Please see my note below.] : Please see my note below. [Sincerely,] : Sincerely, [FreeTextEntry3] : [facility]ales [facility]\par

## 2020-12-21 NOTE — HISTORY OF PRESENT ILLNESS
[Obstructive Sleep Apnea] : obstructive sleep apnea [To Bed: ___] : ~he/she~ goes to bed at [unfilled] [Arises: ___] : arises at [unfilled] [Sleep Onset Latency: ___ minutes] : sleep onset latency of [unfilled] minutes reported [Nocturnal Awakenings: ___] : ~he/she~ typically has [unfilled] nocturnal awakenings [Date: ___] : Date of most recent diagnostic polysomnogram: [unfilled] [AHI: ___ per hour] : Apnea-hypopnea index:  [unfilled] per hour [BPAP w/IPAP: ____ cmH2O] : BPAP with IPAP: [unfilled] cmH2O [BPAP w/EPAP: ___ cmH2O] : BPAP with EPAP: [unfilled] cmH2O [Snoring] : no snoring [Witnessed Apneas] : no witnessed sleep apnea [Frequent Nocturnal Awakening] : no nocturnal awakening [Daytime Somnolence] : no daytime somnolence [Unintentional Sleep while Active] : no unintentional sleep while active [Unintentional Sleep While Inactive] : no unintentional sleep while inactive [Awakes Unrefreshed] : does not awake unrefreshed [Awakes with Headache] : no headache upon awakening [Awakening With Dry Mouth] : no dry mouth upon awakening [Recent  Weight Gain] : no recent weight gain [Unusual Sleep Behavior] : no unusual sleep behavior [Hypersomnolence] : no hypersomnolence [Cataplexy] : no cataplexy [Sleep Paralysis] : no sleep paralysis [Hypnagogic Hallucinations] : no hallucinations when falling asleep [Hypnopompic Hallucinations] : no hallucinations when awakening [% Days used: ____] : Days used: [unfilled] % [% Days used > 4 hrs: ____] : Days used > 4 hrs: [unfilled] % [Therapy based AHI: ___ /hr] : Therapy based AHI: [unfilled] / hr [FreeTextEntry1] : Compliance date 11/17-12/16/20\par DME: [facility]\par Relacement date: 8/15/23

## 2020-12-21 NOTE — DISCUSSION/SUMMARY
[Obstructive Sleep Apnea] : obstructive sleep apnea [Severe] : severe [Responding to Treatment] : responding to treatment [CPAP Titration] : CPAP titration [Alcohol Avoidance] : alcohol avoidance [Sedative Avoidance] : sedative avoidance [Smoking Cessation] : smoking cessation [Weight Loss Program] : weight loss program [BiPAP] : BiPAP [Patient] : discussed with the patient [de-identified] : with central apnea [de-identified] : Patient compliant with CPAP/BiPAP and benefiting from therapy

## 2021-04-16 ENCOUNTER — APPOINTMENT (OUTPATIENT)
Dept: UROLOGY | Facility: CLINIC | Age: 51
End: 2021-04-16

## 2021-10-21 ENCOUNTER — APPOINTMENT (OUTPATIENT)
Dept: ENDOCRINOLOGY | Facility: CLINIC | Age: 51
End: 2021-10-21

## 2022-03-18 ENCOUNTER — NON-APPOINTMENT (OUTPATIENT)
Age: 52
End: 2022-03-18

## 2022-03-18 ENCOUNTER — APPOINTMENT (OUTPATIENT)
Dept: CARDIOLOGY | Facility: CLINIC | Age: 52
End: 2022-03-18
Payer: COMMERCIAL

## 2022-03-18 VITALS
DIASTOLIC BLOOD PRESSURE: 80 MMHG | HEIGHT: 70 IN | SYSTOLIC BLOOD PRESSURE: 142 MMHG | RESPIRATION RATE: 16 BRPM | HEART RATE: 70 BPM | BODY MASS INDEX: 41.09 KG/M2 | OXYGEN SATURATION: 96 % | WEIGHT: 287 LBS

## 2022-03-18 VITALS — SYSTOLIC BLOOD PRESSURE: 168 MMHG | DIASTOLIC BLOOD PRESSURE: 110 MMHG

## 2022-03-18 DIAGNOSIS — E66.01 MORBID (SEVERE) OBESITY DUE TO EXCESS CALORIES: ICD-10-CM

## 2022-03-18 PROCEDURE — 99215 OFFICE O/P EST HI 40 MIN: CPT

## 2022-03-18 PROCEDURE — 93000 ELECTROCARDIOGRAM COMPLETE: CPT

## 2022-03-23 ENCOUNTER — APPOINTMENT (OUTPATIENT)
Dept: MRI IMAGING | Facility: CLINIC | Age: 52
End: 2022-03-23

## 2022-03-29 ENCOUNTER — APPOINTMENT (OUTPATIENT)
Dept: CARDIOLOGY | Facility: CLINIC | Age: 52
End: 2022-03-29

## 2022-04-12 ENCOUNTER — APPOINTMENT (OUTPATIENT)
Dept: CARDIOLOGY | Facility: CLINIC | Age: 52
End: 2022-04-12
Payer: COMMERCIAL

## 2022-04-12 PROCEDURE — 93306 TTE W/DOPPLER COMPLETE: CPT

## 2022-10-21 ENCOUNTER — APPOINTMENT (OUTPATIENT)
Dept: CARDIOLOGY | Facility: CLINIC | Age: 52
End: 2022-10-21

## 2022-12-12 ENCOUNTER — RX RENEWAL (OUTPATIENT)
Age: 52
End: 2022-12-12

## 2023-06-17 ENCOUNTER — NON-APPOINTMENT (OUTPATIENT)
Age: 53
End: 2023-06-17

## 2023-06-19 ENCOUNTER — APPOINTMENT (OUTPATIENT)
Dept: CARDIOLOGY | Facility: CLINIC | Age: 53
End: 2023-06-19
Payer: COMMERCIAL

## 2023-06-19 PROCEDURE — 93306 TTE W/DOPPLER COMPLETE: CPT

## 2023-06-30 ENCOUNTER — APPOINTMENT (OUTPATIENT)
Dept: CARDIOLOGY | Facility: CLINIC | Age: 53
End: 2023-06-30
Payer: COMMERCIAL

## 2023-06-30 ENCOUNTER — NON-APPOINTMENT (OUTPATIENT)
Age: 53
End: 2023-06-30

## 2023-06-30 VITALS
HEART RATE: 93 BPM | BODY MASS INDEX: 39.08 KG/M2 | WEIGHT: 273 LBS | DIASTOLIC BLOOD PRESSURE: 90 MMHG | HEIGHT: 70 IN | OXYGEN SATURATION: 100 % | SYSTOLIC BLOOD PRESSURE: 164 MMHG

## 2023-06-30 VITALS — SYSTOLIC BLOOD PRESSURE: 120 MMHG | DIASTOLIC BLOOD PRESSURE: 70 MMHG

## 2023-06-30 VITALS — DIASTOLIC BLOOD PRESSURE: 90 MMHG | SYSTOLIC BLOOD PRESSURE: 170 MMHG

## 2023-06-30 DIAGNOSIS — I49.3 VENTRICULAR PREMATURE DEPOLARIZATION: ICD-10-CM

## 2023-06-30 DIAGNOSIS — I10 ESSENTIAL (PRIMARY) HYPERTENSION: ICD-10-CM

## 2023-06-30 DIAGNOSIS — G47.33 OBSTRUCTIVE SLEEP APNEA (ADULT) (PEDIATRIC): ICD-10-CM

## 2023-06-30 DIAGNOSIS — Z86.39 PERSONAL HISTORY OF OTHER ENDOCRINE, NUTRITIONAL AND METABOLIC DISEASE: ICD-10-CM

## 2023-06-30 PROCEDURE — 93000 ELECTROCARDIOGRAM COMPLETE: CPT

## 2023-06-30 PROCEDURE — 99214 OFFICE O/P EST MOD 30 MIN: CPT | Mod: 25

## 2023-06-30 RX ORDER — AMLODIPINE BESYLATE 5 MG/1
5 TABLET ORAL DAILY
Refills: 0 | Status: ACTIVE | COMMUNITY

## 2023-07-23 NOTE — CARDIOLOGY SUMMARY
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 \par Diffuse repolarization abnormalities [de-identified] : 6/19/2023:  LVEF 50-55%, mild LVH, calcific aortic valve

## 2023-07-23 NOTE — HISTORY OF PRESENT ILLNESS
[FreeTextEntry1] : 53 year old male with PMH of HTN, DM, hypothyroidism, PVCs, and JOHN compliant with CPAP here for follow up. He is s/p bariatric surgery in 2020 and no longer on hypertension or diabetes medications. He lost over 100 lbs. he states he is feeling good. He denies chest pain, dyspnea, palpitations, near syncope or syncope. Frequent PVCs noted on EKG last year with Holter monitor with PVC burden 4.3%.  Echo 6/19/2023 with normal LV function, mild LVH. He had a normal nuclear stress test 10/24/2019. He is now using CPAP at night. ECG today with no VPCs\par

## 2023-07-23 NOTE — REVIEW OF SYSTEMS
[Weight Loss (___ Lbs)] : [unfilled] ~Ulb weight loss [Negative] : Heme/Lymph [FreeTextEntry6] : using CPAP at night with improved sleep habits

## 2023-07-28 NOTE — H&P PST ADULT - NS SC CAGE ALCOHOL GUILTY ABOUT
13.0   7.28  )-----------( 216      ( 28 Jul 2023 11:25 )             38.1       07-28    143  |  107  |  22  ----------------------------<  107<H>  4.4   |  23  |  1.43<H>    Ca    9.1      28 Jul 2023 11:25  Phos  2.5     07-28  Mg     2.1     07-28    TPro  6.4  /  Alb  4.0  /  TBili  0.6  /  DBili  x   /  AST  41<H>  /  ALT  32  /  AlkPhos  55  07-28      PT/INR - ( 28 Jul 2023 11:25 )   PT: 11.5 sec;   INR: 1.10 ratio         PTT - ( 28 Jul 2023 11:25 )  PTT:23.4 sec            Troponin T, High Sensitivity Result: 12 ng/L (07-28-23 @ 13:51)  Troponin T, High Sensitivity Result: 16 ng/L (07-28-23 @ 11:25) no

## 2024-01-02 ENCOUNTER — RX RENEWAL (OUTPATIENT)
Age: 54
End: 2024-01-02

## 2024-01-02 RX ORDER — VALSARTAN 160 MG/1
160 TABLET, COATED ORAL
Qty: 90 | Refills: 2 | Status: ACTIVE | COMMUNITY
Start: 2022-12-12 | End: 1900-01-01

## 2024-08-20 ENCOUNTER — NON-APPOINTMENT (OUTPATIENT)
Age: 54
End: 2024-08-20

## 2024-08-20 ENCOUNTER — APPOINTMENT (OUTPATIENT)
Dept: CARDIOLOGY | Facility: CLINIC | Age: 54
End: 2024-08-20
Payer: COMMERCIAL

## 2024-08-20 VITALS
HEIGHT: 61 IN | WEIGHT: 290 LBS | OXYGEN SATURATION: 98 % | DIASTOLIC BLOOD PRESSURE: 108 MMHG | HEART RATE: 89 BPM | BODY MASS INDEX: 54.75 KG/M2 | SYSTOLIC BLOOD PRESSURE: 178 MMHG

## 2024-08-20 VITALS — DIASTOLIC BLOOD PRESSURE: 94 MMHG | SYSTOLIC BLOOD PRESSURE: 178 MMHG

## 2024-08-20 DIAGNOSIS — I10 ESSENTIAL (PRIMARY) HYPERTENSION: ICD-10-CM

## 2024-08-20 DIAGNOSIS — E66.01 MORBID (SEVERE) OBESITY DUE TO EXCESS CALORIES: ICD-10-CM

## 2024-08-20 DIAGNOSIS — I49.3 VENTRICULAR PREMATURE DEPOLARIZATION: ICD-10-CM

## 2024-08-20 PROCEDURE — 99214 OFFICE O/P EST MOD 30 MIN: CPT | Mod: 25

## 2024-08-20 PROCEDURE — 93000 ELECTROCARDIOGRAM COMPLETE: CPT

## 2024-09-02 ENCOUNTER — NON-APPOINTMENT (OUTPATIENT)
Age: 54
End: 2024-09-02

## 2024-09-03 ENCOUNTER — APPOINTMENT (OUTPATIENT)
Dept: CARDIOLOGY | Facility: CLINIC | Age: 54
End: 2024-09-03
Payer: COMMERCIAL

## 2024-09-03 PROCEDURE — 93306 TTE W/DOPPLER COMPLETE: CPT

## 2024-09-04 ENCOUNTER — NON-APPOINTMENT (OUTPATIENT)
Age: 54
End: 2024-09-04

## 2024-10-02 ENCOUNTER — APPOINTMENT (OUTPATIENT)
Dept: CARDIOLOGY | Facility: CLINIC | Age: 54
End: 2024-10-02
Payer: COMMERCIAL

## 2024-10-02 ENCOUNTER — NON-APPOINTMENT (OUTPATIENT)
Age: 54
End: 2024-10-02

## 2024-10-02 VITALS
WEIGHT: 291 LBS | SYSTOLIC BLOOD PRESSURE: 146 MMHG | DIASTOLIC BLOOD PRESSURE: 82 MMHG | HEART RATE: 89 BPM | BODY MASS INDEX: 38.57 KG/M2 | OXYGEN SATURATION: 98 % | HEIGHT: 73 IN

## 2024-10-02 VITALS — SYSTOLIC BLOOD PRESSURE: 148 MMHG | DIASTOLIC BLOOD PRESSURE: 90 MMHG

## 2024-10-02 DIAGNOSIS — E66.9 OBESITY, UNSPECIFIED: ICD-10-CM

## 2024-10-02 DIAGNOSIS — I51.7 CARDIOMEGALY: ICD-10-CM

## 2024-10-02 DIAGNOSIS — I10 ESSENTIAL (PRIMARY) HYPERTENSION: ICD-10-CM

## 2024-10-02 PROCEDURE — 93000 ELECTROCARDIOGRAM COMPLETE: CPT

## 2024-10-02 PROCEDURE — 99214 OFFICE O/P EST MOD 30 MIN: CPT | Mod: 25

## 2024-10-30 ENCOUNTER — TRANSCRIPTION ENCOUNTER (OUTPATIENT)
Age: 54
End: 2024-10-30

## 2024-11-05 ENCOUNTER — TRANSCRIPTION ENCOUNTER (OUTPATIENT)
Age: 54
End: 2024-11-05

## 2025-01-17 ENCOUNTER — APPOINTMENT (OUTPATIENT)
Dept: CARDIOLOGY | Facility: CLINIC | Age: 55
End: 2025-01-17

## 2025-01-17 ENCOUNTER — NON-APPOINTMENT (OUTPATIENT)
Age: 55
End: 2025-01-17

## 2025-01-17 VITALS
OXYGEN SATURATION: 96 % | SYSTOLIC BLOOD PRESSURE: 150 MMHG | WEIGHT: 304 LBS | BODY MASS INDEX: 40.29 KG/M2 | HEIGHT: 73 IN | DIASTOLIC BLOOD PRESSURE: 94 MMHG | HEART RATE: 90 BPM

## 2025-01-17 VITALS — SYSTOLIC BLOOD PRESSURE: 124 MMHG | DIASTOLIC BLOOD PRESSURE: 82 MMHG

## 2025-01-17 DIAGNOSIS — E03.9 HYPOTHYROIDISM, UNSPECIFIED: ICD-10-CM

## 2025-01-17 DIAGNOSIS — I51.7 CARDIOMEGALY: ICD-10-CM

## 2025-01-17 DIAGNOSIS — E78.2 MIXED HYPERLIPIDEMIA: ICD-10-CM

## 2025-01-17 DIAGNOSIS — I49.3 VENTRICULAR PREMATURE DEPOLARIZATION: ICD-10-CM

## 2025-01-17 PROCEDURE — 99213 OFFICE O/P EST LOW 20 MIN: CPT | Mod: 25

## 2025-01-17 PROCEDURE — 93000 ELECTROCARDIOGRAM COMPLETE: CPT

## 2025-06-02 ENCOUNTER — RX RENEWAL (OUTPATIENT)
Age: 55
End: 2025-06-02